# Patient Record
Sex: MALE | Race: WHITE | NOT HISPANIC OR LATINO
[De-identification: names, ages, dates, MRNs, and addresses within clinical notes are randomized per-mention and may not be internally consistent; named-entity substitution may affect disease eponyms.]

---

## 2018-11-07 PROBLEM — Z00.00 ENCOUNTER FOR PREVENTIVE HEALTH EXAMINATION: Status: ACTIVE | Noted: 2018-11-07

## 2021-02-08 ENCOUNTER — APPOINTMENT (OUTPATIENT)
Dept: OTOLARYNGOLOGY | Facility: CLINIC | Age: 56
End: 2021-02-08

## 2021-03-31 ENCOUNTER — APPOINTMENT (OUTPATIENT)
Dept: DISASTER EMERGENCY | Facility: OTHER | Age: 56
End: 2021-03-31
Payer: COMMERCIAL

## 2021-03-31 PROCEDURE — 0011A: CPT

## 2021-04-28 ENCOUNTER — APPOINTMENT (OUTPATIENT)
Dept: DISASTER EMERGENCY | Facility: OTHER | Age: 56
End: 2021-04-28
Payer: COMMERCIAL

## 2021-04-28 PROCEDURE — 0012A: CPT

## 2021-12-02 ENCOUNTER — TRANSCRIPTION ENCOUNTER (OUTPATIENT)
Age: 56
End: 2021-12-02

## 2021-12-02 ENCOUNTER — INPATIENT (INPATIENT)
Facility: HOSPITAL | Age: 56
LOS: 0 days | Discharge: HOME | DRG: 287 | End: 2021-12-02
Attending: STUDENT IN AN ORGANIZED HEALTH CARE EDUCATION/TRAINING PROGRAM | Admitting: STUDENT IN AN ORGANIZED HEALTH CARE EDUCATION/TRAINING PROGRAM
Payer: COMMERCIAL

## 2021-12-02 ENCOUNTER — EMERGENCY (EMERGENCY)
Facility: HOSPITAL | Age: 56
LOS: 1 days | Discharge: ANOTHER HOSPITAL | End: 2021-12-02
Attending: EMERGENCY MEDICINE | Admitting: EMERGENCY MEDICINE
Payer: COMMERCIAL

## 2021-12-02 VITALS
TEMPERATURE: 99 F | OXYGEN SATURATION: 98 % | RESPIRATION RATE: 14 BRPM | HEART RATE: 67 BPM | SYSTOLIC BLOOD PRESSURE: 138 MMHG | DIASTOLIC BLOOD PRESSURE: 99 MMHG

## 2021-12-02 VITALS
WEIGHT: 190.04 LBS | RESPIRATION RATE: 16 BRPM | OXYGEN SATURATION: 100 % | DIASTOLIC BLOOD PRESSURE: 88 MMHG | SYSTOLIC BLOOD PRESSURE: 144 MMHG | TEMPERATURE: 97 F | HEART RATE: 82 BPM

## 2021-12-02 VITALS
TEMPERATURE: 98 F | HEART RATE: 69 BPM | WEIGHT: 190.04 LBS | RESPIRATION RATE: 19 BRPM | HEIGHT: 72 IN | DIASTOLIC BLOOD PRESSURE: 84 MMHG | SYSTOLIC BLOOD PRESSURE: 147 MMHG | OXYGEN SATURATION: 98 %

## 2021-12-02 VITALS
DIASTOLIC BLOOD PRESSURE: 70 MMHG | OXYGEN SATURATION: 95 % | HEART RATE: 60 BPM | RESPIRATION RATE: 17 BRPM | SYSTOLIC BLOOD PRESSURE: 115 MMHG

## 2021-12-02 DIAGNOSIS — S02.85XA FRACTURE OF ORBIT, UNSPECIFIED, INITIAL ENCOUNTER FOR CLOSED FRACTURE: Chronic | ICD-10-CM

## 2021-12-02 DIAGNOSIS — I21.4 NON-ST ELEVATION (NSTEMI) MYOCARDIAL INFARCTION: ICD-10-CM

## 2021-12-02 LAB
ANION GAP SERPL CALC-SCNC: 7 MMOL/L — SIGNIFICANT CHANGE UP (ref 5–17)
BUN SERPL-MCNC: 21 MG/DL — SIGNIFICANT CHANGE UP (ref 7–23)
CALCIUM SERPL-MCNC: 8.7 MG/DL — SIGNIFICANT CHANGE UP (ref 8.5–10.1)
CHLORIDE SERPL-SCNC: 108 MMOL/L — SIGNIFICANT CHANGE UP (ref 96–108)
CO2 SERPL-SCNC: 27 MMOL/L — SIGNIFICANT CHANGE UP (ref 22–31)
CREAT SERPL-MCNC: 1 MG/DL — SIGNIFICANT CHANGE UP (ref 0.5–1.3)
GLUCOSE SERPL-MCNC: 97 MG/DL — SIGNIFICANT CHANGE UP (ref 70–99)
HCT VFR BLD CALC: 42.3 % — SIGNIFICANT CHANGE UP (ref 39–50)
HGB BLD-MCNC: 14.9 G/DL — SIGNIFICANT CHANGE UP (ref 13–17)
MCHC RBC-ENTMCNC: 29.8 PG — SIGNIFICANT CHANGE UP (ref 27–34)
MCHC RBC-ENTMCNC: 35.2 GM/DL — SIGNIFICANT CHANGE UP (ref 32–36)
MCV RBC AUTO: 84.6 FL — SIGNIFICANT CHANGE UP (ref 80–100)
NRBC # BLD: 0 /100 WBCS — SIGNIFICANT CHANGE UP (ref 0–0)
PLATELET # BLD AUTO: 213 K/UL — SIGNIFICANT CHANGE UP (ref 150–400)
POTASSIUM SERPL-MCNC: 4.2 MMOL/L — SIGNIFICANT CHANGE UP (ref 3.5–5.3)
POTASSIUM SERPL-SCNC: 4.2 MMOL/L — SIGNIFICANT CHANGE UP (ref 3.5–5.3)
RBC # BLD: 5 M/UL — SIGNIFICANT CHANGE UP (ref 4.2–5.8)
RBC # FLD: 11.6 % — SIGNIFICANT CHANGE UP (ref 10.3–14.5)
SARS-COV-2 RNA SPEC QL NAA+PROBE: SIGNIFICANT CHANGE UP
SODIUM SERPL-SCNC: 142 MMOL/L — SIGNIFICANT CHANGE UP (ref 135–145)
TROPONIN I, HIGH SENSITIVITY RESULT: 27 NG/L — SIGNIFICANT CHANGE UP
TROPONIN I, HIGH SENSITIVITY RESULT: 79.2 NG/L — HIGH
WBC # BLD: 6.45 K/UL — SIGNIFICANT CHANGE UP (ref 3.8–10.5)
WBC # FLD AUTO: 6.45 K/UL — SIGNIFICANT CHANGE UP (ref 3.8–10.5)

## 2021-12-02 PROCEDURE — C1769: CPT

## 2021-12-02 PROCEDURE — 93458 L HRT ARTERY/VENTRICLE ANGIO: CPT | Mod: 26

## 2021-12-02 PROCEDURE — 99152 MOD SED SAME PHYS/QHP 5/>YRS: CPT

## 2021-12-02 PROCEDURE — 36415 COLL VENOUS BLD VENIPUNCTURE: CPT

## 2021-12-02 PROCEDURE — 93010 ELECTROCARDIOGRAM REPORT: CPT | Mod: 77

## 2021-12-02 PROCEDURE — 93005 ELECTROCARDIOGRAM TRACING: CPT

## 2021-12-02 PROCEDURE — 99285 EMERGENCY DEPT VISIT HI MDM: CPT

## 2021-12-02 PROCEDURE — 93010 ELECTROCARDIOGRAM REPORT: CPT

## 2021-12-02 PROCEDURE — 99285 EMERGENCY DEPT VISIT HI MDM: CPT | Mod: 25

## 2021-12-02 PROCEDURE — 80048 BASIC METABOLIC PNL TOTAL CA: CPT

## 2021-12-02 PROCEDURE — 99153 MOD SED SAME PHYS/QHP EA: CPT

## 2021-12-02 PROCEDURE — 87635 SARS-COV-2 COVID-19 AMP PRB: CPT

## 2021-12-02 PROCEDURE — 85027 COMPLETE CBC AUTOMATED: CPT

## 2021-12-02 PROCEDURE — C1887: CPT

## 2021-12-02 PROCEDURE — 84484 ASSAY OF TROPONIN QUANT: CPT

## 2021-12-02 PROCEDURE — 93458 L HRT ARTERY/VENTRICLE ANGIO: CPT

## 2021-12-02 PROCEDURE — C1894: CPT

## 2021-12-02 RX ORDER — ACETAMINOPHEN 500 MG
650 TABLET ORAL EVERY 6 HOURS
Refills: 0 | Status: DISCONTINUED | OUTPATIENT
Start: 2021-12-02 | End: 2021-12-02

## 2021-12-02 RX ORDER — CLOPIDOGREL BISULFATE 75 MG/1
300 TABLET, FILM COATED ORAL ONCE
Refills: 0 | Status: COMPLETED | OUTPATIENT
Start: 2021-12-02 | End: 2021-12-02

## 2021-12-02 RX ORDER — NIFEDIPINE 30 MG
30 TABLET, EXTENDED RELEASE 24 HR ORAL DAILY
Refills: 0 | Status: DISCONTINUED | OUTPATIENT
Start: 2021-12-02 | End: 2021-12-02

## 2021-12-02 RX ORDER — NIFEDIPINE 30 MG
1 TABLET, EXTENDED RELEASE 24 HR ORAL
Qty: 60 | Refills: 0
Start: 2021-12-02 | End: 2022-01-30

## 2021-12-02 RX ORDER — IBUPROFEN 200 MG
1 TABLET ORAL
Qty: 0 | Refills: 0 | DISCHARGE

## 2021-12-02 RX ORDER — ASPIRIN/CALCIUM CARB/MAGNESIUM 324 MG
325 TABLET ORAL ONCE
Refills: 0 | Status: COMPLETED | OUTPATIENT
Start: 2021-12-02 | End: 2021-12-02

## 2021-12-02 RX ADMIN — Medication 650 MILLIGRAM(S): at 22:18

## 2021-12-02 RX ADMIN — Medication 30 MILLIGRAM(S): at 23:15

## 2021-12-02 RX ADMIN — CLOPIDOGREL BISULFATE 300 MILLIGRAM(S): 75 TABLET, FILM COATED ORAL at 12:56

## 2021-12-02 RX ADMIN — Medication 325 MILLIGRAM(S): at 10:24

## 2021-12-02 NOTE — ASU PATIENT PROFILE, ADULT - FALL HARM RISK - UNIVERSAL INTERVENTIONS
Bed in lowest position, wheels locked, appropriate side rails in place/Call bell, personal items and telephone in reach/Instruct patient to call for assistance before getting out of bed or chair/Non-slip footwear when patient is out of bed/[place] to call system/Physically safe environment - no spills, clutter or unnecessary equipment/Purposeful Proactive Rounding/Room/bathroom lighting operational, light cord in reach

## 2021-12-02 NOTE — H&P CARDIOLOGY - HISTORY OF PRESENT ILLNESS
56 yo M with no significant PMhx  c/o palpitations and CP , dizziness and mild SOB while working. Pain lasted 20 minutes, rated a  5/10 in severity , non radiating. He does admit to occasional CP recently.  56 yo M with no significant PMhx  c/o palpitations and CP , dizziness and mild SOB while working. Pain lasted 20 minutes, rated a  5/10 in severity , non radiating. He does admit to occasional ALANIZ  and palpitations with activity. He went to [facility] today trop were 27 , 79 , he was transferred here for [facility].   No fever or chills. No N/V/D or abd pain.

## 2021-12-02 NOTE — ED PROVIDER NOTE - PROGRESS NOTE DETAILS
Rapid Trop I increase to 79. Case re-discussed with Dr. Chang. Give Plavix and he will see patient. Seen by Dr. Chang and patient will be transferred to [facility] cath by Dr. Child.

## 2021-12-02 NOTE — DISCHARGE NOTE NURSING/CASE MANAGEMENT/SOCIAL WORK - PATIENT PORTAL LINK FT
You can access the FollowMyHealth Patient Portal offered by [facility] by registering at the following website: [email or web address]. By joining [employer]’s FollowMyHealth portal, you will also be able to view your health information using other applications (apps) compatible with our system.

## 2021-12-02 NOTE — CONSULT NOTE ADULT - SUBJECTIVE AND OBJECTIVE BOX
Patient is a 55y old  Male who presents with a chief complaint of palpitations, CP    HPI: 56 y/o with pmhx of gerd who presents to the ED with chest palpations and CP. 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 Also states he felt a momentary twinge in his L chest which spontaneously resolved. Does not remember exact time of symptom onset. Reports palpitations lasted for 30 before resolving. States he was in his normal state of health last night and in the AM. States he has had several episodes of palpitations in the past 6 months, usually when he lays down to sleep. Also reports ALANIZ "recently" which usually resolve with rest. Has never seen a cardiologist but reports he had an TTE and carotid doppler a few years ago for dizziness which was reportedly WNL. At bedside, patient reports 1 episode of L sided chest twinge but otherwise has no complaints.       PAST MEDICAL & SURGICAL HISTORY:  No pertinent past medical history    Orbital wall fracture    MEDICATIONS  (STANDING):    MEDICATIONS  (PRN):    FAMILY HISTORY:    Denies Family history of CAD or early MI      Constitutional: denies fever, chills  HEENT: denies blurry vision, difficulty hearing  Respiratory: admits ALANIZ, denies cough  Cardiovascular: denies CP, palpitations, orthopnea, PND, LE edema  Gastrointestinal: denies nausea, vomiting, abdominal pain  Genitourinary: denies urinary changes  Skin: Denies rashes, itching  Neurologic: admits dizziness, denies headache, weakness  Hematology/Oncology: denies bleeding, easy bruising      SOCIAL HISTORY:  Current smoker, vapes daily. Former cigarette smoker, 1 ppd for 30 years    Vital Signs Last 24 Hrs  T(C): 36.3 (02 Dec 2021 09:25), Max: 36.3 (02 Dec 2021 09:25)  T(F): 97.3 (02 Dec 2021 09:25), Max: 97.3 (02 Dec 2021 09:25)  HR: 82 (02 Dec 2021 09:25) (82 - 82)  BP: 144/88 (02 Dec 2021 09:25) (144/88 - 144/88)  BP(mean): --  RR: 16 (02 Dec 2021 09:25) (16 - 16)  SpO2: 100% (02 Dec 2021 09:25) (100% - 100%)    Physical Exam:  General: Well developed, well nourished, NAD  HEENT: NCAT, EOMI bl, moist mucous membranes   Neck: Supple, nontender  Neurology: A&Ox3, nonfocal, sensation intact   Respiratory: CTA B/L, No W/R/R  CV: RRR, +S1/S2, no murmurs, rubs or gallops  Abdominal: Soft, NT, ND  Extremities: No C/C/E  Heme: No obvious ecchymosis or petechiae   Skin: warm, dry      ECG: NSR 78 bpm    I&O's Detail      LABS:                        14.9   6.45  )-----------( 213      ( 02 Dec 2021 10:33 )             42.3     12-02    142  |  108  |  21  ----------------------------<  97  4.2   |  27  |  1.00    Ca    8.7      02 Dec 2021 10:33              I&O's Summary    BNP  RADIOLOGY & ADDITIONAL STUDIES:

## 2021-12-02 NOTE — DISCHARGE NOTE PROVIDER - NSDCFUADDINST_GEN_ALL_CORE_FT

## 2021-12-02 NOTE — DISCHARGE NOTE PROVIDER - NSDCMRMEDTOKEN_GEN_ALL_CORE_FT
NexIUM 20 mg oral delayed release capsule: 2 cap(s) orally once a day  NIFEdipine 30 mg oral tablet, extended release: 1 tab(s) orally once a day

## 2021-12-02 NOTE — ED ADULT NURSE NOTE - OBJECTIVE STATEMENT
Patient alert and oriented X 3. Complaining of chest pain, dizziness X 45 minutes. Denies chest pain, shortness of breath, nausea, vomiting, headache, dizziness and fever. Lungs clears bilaterally. Respirations even and not labored. Abdomen soft non tender. WIll continue to moniter. Patient alert and oriented X 3. Complaining of chest pain, dizziness X 45 minutes. Denies chest pain, shortness of breath, nausea, vomiting, headache, dizziness and fever. Lungs clears bilaterally. Respirations even and not labored. Abdomen soft non tender. Symptoms resolved at this time.

## 2021-12-02 NOTE — ED PROVIDER NOTE - OBJECTIVE STATEMENT
56 yo white male with no significant PMHx was well up until this morning when while at work he developed palpitations as well as ill defined left sided chest pressure, dizziness and mild SOB all while scraping floor. Episode lasted for approximately 20 minutes and is now completely asymptomatic. States that recently has noted brief episodes of exertional chest pressure. No fever, chills, cough, neck pain, back pains or abdominal pains.

## 2021-12-02 NOTE — CONSULT NOTE ADULT - ATTENDING COMMENTS
Pt with typical anginal symptoms. ekg no ischemic changes but with stuttering sx now w + trops. loaded plavix. cont asa. plan for cath today. Risk (including but not limited to periprocedureal MI and death), benefits, alternatives fully explained to the patient. The patient agrees to the procedure and I will arrange to it to be done.

## 2021-12-02 NOTE — DISCHARGE NOTE PROVIDER - NSDCCPCAREPLAN_GEN_ALL_CORE_FT
PRINCIPAL DISCHARGE DIAGNOSIS  Diagnosis: Coronary artery spasm  Assessment and Plan of Treatment: s/p LHC with coronary spasm to right coronary artery.   Started on nifedipine 30mg daily   Follow up with cardiologist in 1-2 weeks

## 2021-12-02 NOTE — DISCHARGE NOTE NURSING/CASE MANAGEMENT/SOCIAL WORK - NSDCPEFALRISK_GEN_ALL_CORE
For information on Fall & Injury Prevention, visit: [email or web address] OR  [email or web address] OR  https://www.cdc.gov/steadi/patient.html

## 2021-12-02 NOTE — ED ADULT NURSE NOTE - NSICDXPASTSURGICALHX_GEN_ALL_CORE_FT
PAST SURGICAL HISTORY:  No significant past surgical history PAST SURGICAL HISTORY:  Orbital wall fracture

## 2021-12-02 NOTE — CONSULT NOTE ADULT - ASSESSMENT
54 y/o with pmhx of gerd who presents to the ED with chest palpations and CP. Now found to have elevated troponin    Chest pain/palpitations/elevated troponin  -Patient with palpitations for 30mins in AM and L sided chest twinge, now resolved  -EKG NSR no ischemic changes  -Initial trop negative at 27, 2nd trop increased to 79.2, obtain 3rd  -S/p FD asa and plavix 300mg  -TTE and carotid doppler a few years ago reportedly WNL  -Never had stress test  -Monitor on tele    - BP well controlled, monitor routine hemodynamics.  - Monitor and replete lytes, keep K>4, Mg>2.  - Other cardiovascular workup will depend on clinical course.  - All other workup per primary team.  - Will continue to follow.             CHARTING IN PROGRESS    54 y/o with pmhx of gerd who presents to the ED with chest palpations and CP. Now found to have elevated troponin    Chest pain/palpitations/elevated troponin, concern for ACS  -Patient with palpitations for 30mins in AM and L sided chest twinge, now resolved  -EKG NSR no ischemic changes  -Initial trop negative at 27, 2nd trop increased to 79.2, obtain 3rd  -S/p FD asa and plavix 300mg  -TTE and carotid doppler a few years ago reportedly WNL  -Never had stress test  -Given symptoms of typical chest pain, rising troponin, and potential for poor follow up (has not seen Dr in years), would be prudent to transfer for cath  -Monitor on tele    - BP well controlled, monitor routine hemodynamics.  - Monitor and replete lytes, keep K>4, Mg>2.  - Other cardiovascular workup will depend on clinical course.  - All other workup per primary team.  - Will continue to follow.             CHARTING IN PROGRESS    54 y/o with pmhx of gerd who presents to the ED with chest palpations and CP. Now found to have elevated troponin    Chest pain/palpitations/elevated troponin, concern for ACS  -Patient with palpitations for 30mins in AM and L sided chest twinge associated with dizziness, now resolved  -EKG NSR no ischemic changes  -Initial trop negative at 27, 2nd trop increased to 79.2, obtain 3rd  -S/p FD asa and plavix 300mg  -TTE and carotid doppler a few years ago reportedly WNL  -Never had stress test  -Given symptoms of typical chest pain, rising troponin, and potential for poor follow up (has not seen Dr in years), would be prudent to transfer for cath  -Monitor on tele    - BP well controlled, monitor routine hemodynamics.  - Monitor and replete lytes, keep K>4, Mg>2.  - Other cardiovascular workup will depend on clinical course.  - All other workup per primary team.  - Will continue to follow.                 56 y/o with pmhx of gerd who presents to the ED with chest palpations and CP. Now found to have elevated troponin    Chest pain/palpitations/elevated troponin, concern for ACS  -Patient with palpitations for 30mins in AM and L sided chest twinge associated with dizziness, now resolved  -EKG NSR no ischemic changes  -Initial trop negative at 27, 2nd trop increased to 79.2, obtain 3rd  -S/p FD asa and plavix 300mg  -TTE and carotid doppler a few years ago reportedly WNL  -Never had stress test  -Given symptoms of typical chest pain, rising troponin, and potential for poor follow up (has not seen Dr in years), would be prudent to transfer for cath  -Monitor on tele    - BP well controlled, monitor routine hemodynamics.  - Monitor and replete lytes, keep K>4, Mg>2.  - Other cardiovascular workup will depend on clinical course.  - All other workup per primary team.  - Will continue to follow.

## 2021-12-02 NOTE — DISCHARGE NOTE PROVIDER - NSDCCPTREATMENT_GEN_ALL_CORE_FT
PRINCIPAL PROCEDURE  Procedure: Left heart catheterization  Findings and Treatment: s/p diagnostic Left heart catheterization with coronary spasm to right coronary artery. Started on nifedipine 30mg daily

## 2021-12-02 NOTE — DISCHARGE NOTE PROVIDER - HOSPITAL COURSE
HPI:  54 yo M with no significant PMhx  c/o palpitations and CP , dizziness and mild SOB while working. Pain lasted 20 minutes, rated a  5/10 in severity , non radiating. He does admit to occasional ALANIZ  and palpitations with activity. He went to [facility] today trop were 27 , 79 , he was transferred here for [facility].   No fever or chills. No N/V/D or abd pain.    (02 Dec 2021 18:08)      12/2/21 s/p Left Heart Catheterization via Right radial artery and noted to have coronary spasm. Start Nifedipine 30mg daily

## 2021-12-02 NOTE — H&P CARDIOLOGY - PRO TOBACCO QUIT READY
has stopped cigarettes but now vaping and is  cutting down , working on quiting  all together/ready to quit

## 2021-12-02 NOTE — ED ADULT NURSE NOTE - NSIMPLEMENTINTERV_GEN_ALL_ED
Implemented All Universal Safety Interventions:  [place] to call system. Call bell, personal items and telephone within reach. Instruct patient to call for assistance. Room bathroom lighting operational. Non-slip footwear when patient is off stretcher. Physically safe environment: no spills, clutter or unnecessary equipment. Stretcher in lowest position, wheels locked, appropriate side rails in place.

## 2021-12-02 NOTE — DISCHARGE NOTE PROVIDER - CARE PROVIDER_API CALL
Gigi Child)  Cardiology; Internal Medicine; Interventional Cardiology  [address], [address]  [place], [place] [id number]  Phone: [phone]  Fax: [phone]  Follow Up Time: 2 weeks

## 2021-12-02 NOTE — ED ADULT TRIAGE NOTE - CHIEF COMPLAINT QUOTE
55 yr old male c/o " I started having palpitations, dizziness, and sob today at 0900. I'm also having bad heart burn." Denies syncope, LOC.

## 2021-12-02 NOTE — ED PROVIDER NOTE - CARE PLAN
1 Principal Discharge DX:	ACS (acute coronary syndrome)   Principal Discharge DX:	ACS (acute coronary syndrome)  Secondary Diagnosis:	NSTEMI (non-ST elevation myocardial infarction)

## 2021-12-06 ENCOUNTER — APPOINTMENT (OUTPATIENT)
Dept: CARDIOLOGY | Facility: CLINIC | Age: 56
End: 2021-12-06
Payer: COMMERCIAL

## 2021-12-06 ENCOUNTER — NON-APPOINTMENT (OUTPATIENT)
Age: 56
End: 2021-12-06

## 2021-12-06 VITALS
HEART RATE: 61 BPM | BODY MASS INDEX: 25.47 KG/M2 | OXYGEN SATURATION: 98 % | WEIGHT: 188 LBS | SYSTOLIC BLOOD PRESSURE: 130 MMHG | HEIGHT: 72 IN | DIASTOLIC BLOOD PRESSURE: 87 MMHG

## 2021-12-06 DIAGNOSIS — Z78.9 OTHER SPECIFIED HEALTH STATUS: ICD-10-CM

## 2021-12-06 DIAGNOSIS — F17.200 NICOTINE DEPENDENCE, UNSPECIFIED, UNCOMPLICATED: ICD-10-CM

## 2021-12-06 DIAGNOSIS — R07.9 CHEST PAIN, UNSPECIFIED: ICD-10-CM

## 2021-12-06 PROBLEM — K21.9 GASTRO-ESOPHAGEAL REFLUX DISEASE WITHOUT ESOPHAGITIS: Chronic | Status: ACTIVE | Noted: 2021-12-02

## 2021-12-06 PROCEDURE — 93000 ELECTROCARDIOGRAM COMPLETE: CPT

## 2021-12-06 PROCEDURE — 99214 OFFICE O/P EST MOD 30 MIN: CPT | Mod: 25

## 2021-12-06 PROCEDURE — 99406 BEHAV CHNG SMOKING 3-10 MIN: CPT

## 2021-12-06 RX ORDER — ESOMEPRAZOLE MAGNESIUM 20 MG/1
20 CAPSULE, DELAYED RELEASE ORAL
Refills: 0 | Status: ACTIVE | COMMUNITY

## 2021-12-09 NOTE — CARDIOLOGY SUMMARY
[de-identified] : SR  [de-identified] : The coronary circulation is right dominant.  \par LAD:Left anterior descending artery: Angiography shows mild atherosclerosis.\par CX Circumflex: Angiography shows minor irregularities.  \par RCA Proximal right coronary artery: Vessel spasm was noted and was treated with nitroglycerin.\par Left Heart Cath Ejection fraction is 60%

## 2021-12-09 NOTE — HISTORY OF PRESENT ILLNESS
[FreeTextEntry1] : 56-year-old man with a history of smoking, Prinzmetal angina presents today for a follow-up visit.\par \par He was initially seen in [place] ED on 12/2/2021 for chest pain and an NSTEMI.  He was sent for coronary angiogram which showed a proximal RCA lesion which improved completely with administration of nitroglycerin.  It was thought that he had significant vasospastic disease and started on a calcium channel blocker.\par \par He is here for a follow-up visit.  He states that he has been feeling more palpitations recently especially after he takes his medication.  He sometimes feels his heart racing at nighttime when he lays down.  The symptoms appear to be self-limiting.\par He   denies any chest pain, PND, orthopnea, lower extremity edema, near syncope, syncope, strokelike symptoms.\par He does not formally exercise but his job is labor-intensive (working wood dave).  He is currently taking a week off of work.  He has been compliant with the medications post his discharge.  He has been still vaping\par \par

## 2021-12-09 NOTE — DISCUSSION/SUMMARY
[FreeTextEntry1] : 56 year man with a history as listed presents for a followup cardiac evaluation. 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  His EKG does not have any ischemic changes.  He will continue with nifedipine 30 mg daily.  Hopefully this will prevent any further vasospastic disease. He will get a 2d echo to rule out underlying structural heart disease.   I have encouraged him to stay well-hydrated in order to support his blood pressure.  He has been having palpitations that do not appear to be arrhythmic in nature.  He will get a 2-week Zio patch to rule out any underlying arrhythmias.  We spoke at length about quitting vaping.\par He was noted to have mild CAD on his coronary angiography.  We will have to assess his lipid profile.  He most likely will need statin therapy in the near future.\par Exercise and diet counseling was performed in order to reduce her future cardiovascular risk. \par He will followup with me in 2-3 months or sooner if necessary.

## 2021-12-15 ENCOUNTER — APPOINTMENT (OUTPATIENT)
Dept: CARDIOLOGY | Facility: CLINIC | Age: 56
End: 2021-12-15
Payer: COMMERCIAL

## 2021-12-15 VITALS
OXYGEN SATURATION: 99 % | DIASTOLIC BLOOD PRESSURE: 84 MMHG | HEIGHT: 72 IN | BODY MASS INDEX: 24.92 KG/M2 | RESPIRATION RATE: 17 BRPM | HEART RATE: 61 BPM | SYSTOLIC BLOOD PRESSURE: 135 MMHG | WEIGHT: 184 LBS

## 2021-12-15 PROCEDURE — 93000 ELECTROCARDIOGRAM COMPLETE: CPT

## 2021-12-15 PROCEDURE — 99214 OFFICE O/P EST MOD 30 MIN: CPT

## 2021-12-17 ENCOUNTER — APPOINTMENT (OUTPATIENT)
Dept: CARDIOLOGY | Facility: CLINIC | Age: 56
End: 2021-12-17
Payer: COMMERCIAL

## 2021-12-17 PROCEDURE — 93306 TTE W/DOPPLER COMPLETE: CPT

## 2021-12-22 ENCOUNTER — NON-APPOINTMENT (OUTPATIENT)
Age: 56
End: 2021-12-22

## 2021-12-22 NOTE — REASON FOR VISIT
[FreeTextEntry1] : Kal Reeves is a 56 year old man who was recently admitted for NSTEMI after transfer from [facility] to [facility]. He was found to have coronary spasm of proximal RCA which resolved with nitroglycerin IC. He was started on CCB and discharged.\par In the interim period, he has followed up with Dr. Ailyn Chang and has returned to labor intesive work. \par He is doing well, no further issues on nifedipine

## 2021-12-22 NOTE — CARDIOLOGY SUMMARY
[de-identified] : Sinus rhythm  [de-identified] : The coronary circulation is right dominant. \par LAD:Left anterior descending artery: Angiography shows mild atherosclerosis.\par CX Circumflex: Angiography shows minor irregularities. \par RCA Proximal right coronary artery: Vessel spasm was noted and was treated with nitroglycerin.\par Left Heart Cath Ejection fraction is 60% \par

## 2021-12-22 NOTE — DISCUSSION/SUMMARY
[FreeTextEntry1] : 56 year old man with recent NSTEMI in the setting of coronary spasm, resolved with IC nitroglycerin. \par EKG is within normal limits, and he has no further symptoms. Doing well post cath, radial with good pulse. \par -continue nifedipine 30 mg daily, maintain at current dose for now. \par -counseled on coronary spasm physiology and triggers, as well as diet and exercise counseling\par -patient to follow up with Dr. Chang for cardiology needs including risk factor modification and further counseling \par

## 2022-01-03 ENCOUNTER — EMERGENCY (EMERGENCY)
Facility: HOSPITAL | Age: 57
LOS: 1 days | Discharge: HOME | End: 2022-01-03
Attending: EMERGENCY MEDICINE
Payer: COMMERCIAL

## 2022-01-03 VITALS
HEART RATE: 75 BPM | SYSTOLIC BLOOD PRESSURE: 139 MMHG | DIASTOLIC BLOOD PRESSURE: 90 MMHG | TEMPERATURE: 98 F | OXYGEN SATURATION: 96 % | WEIGHT: 175.05 LBS | HEIGHT: 72 IN | RESPIRATION RATE: 20 BRPM

## 2022-01-03 DIAGNOSIS — S02.85XA FRACTURE OF ORBIT, UNSPECIFIED, INITIAL ENCOUNTER FOR CLOSED FRACTURE: Chronic | ICD-10-CM

## 2022-01-03 PROCEDURE — 93010 ELECTROCARDIOGRAM REPORT: CPT

## 2022-01-03 PROCEDURE — 71045 X-RAY EXAM CHEST 1 VIEW: CPT | Mod: 26

## 2022-01-03 PROCEDURE — 99285 EMERGENCY DEPT VISIT HI MDM: CPT

## 2022-01-03 RX ORDER — ASPIRIN/CALCIUM CARB/MAGNESIUM 324 MG
162 TABLET ORAL ONCE
Refills: 0 | Status: COMPLETED | OUTPATIENT
Start: 2022-01-03 | End: 2022-01-03

## 2022-01-03 NOTE — ED PROVIDER NOTE - PHYSICAL EXAMINATION
General: NAD, well appearing  HEENT: Normocephalic, atraumatic  Neck: No apparent stiffness or JVD  Pulm: Chest wall symmetric and nontender, lungs clear to ascultation   Cardiac: Regular rate and regular rhythm  Abdomen: Nontender and nondistended  Skin: Skin is warm, dry and intact without rashes or lesions.  Neuro: No motor or sensory deficits above reported baseline  MSK: No deformity or tenderness above reported baseline

## 2022-01-03 NOTE — ED PROVIDER NOTE - PATIENT PORTAL LINK FT
You can access the FollowMyHealth Patient Portal offered by [facility] by registering at the following website: [email or web address]. By joining [employer]’s FollowMyHealth portal, you will also be able to view your health information using other applications (apps) compatible with our system.

## 2022-01-03 NOTE — ED PROVIDER NOTE - ATTENDING CONTRIBUTION TO CARE
I personally saw the patient with the resident, and completed the key components of the history and physical exam. I then discussed the management plan with the resident.   gen in nad resp clear cardiac no murmur abd soft neuro: CN II - XII intact, EOMI, PERRL, no papilledema, 5/5 muscle strength x 4 extremities, no sensory deficits, 2+ dtr globally, negative babinski, no ataxic gait, normal CIARA and FNT, normal romberg 56M Pmh coronary spasm found on cath last month, now on Nifedipine 30mg daily, p/w 2 hours chest pressure ending at 18:30 similar to previous spasm.  Currently denies pain, bleeding, SOB, chest pain, abdominal pain or fevers.  EKg nonischemic.  Ap - similar symptoms to prior. feeling better now. will get labs, delta troponin.

## 2022-01-03 NOTE — ED PROVIDER NOTE - OBJECTIVE STATEMENT
56M, 1 month s/p Left Heart Catheterization via Right radial artery and noted to have coronary spasm, now on Nifedipine 30mg daily, p/w 2 hours chest pressure ending at 18:30 similar to previous spasm.  Currently denies pain, bleeding, SOB, chest pain, abdominal pain or fevers.  Denies other pertinent medical problems.  Denies any substance use.

## 2022-01-03 NOTE — ED PROVIDER NOTE - CLINICAL SUMMARY MEDICAL DECISION MAKING FREE TEXT BOX
56M, 1 month s/p Left Heart Catheterization via Right radial artery and noted to have coronary spasm, now on Nifedipine 30mg daily, p/w 2 hours chest pressure ending at 18:30 similar to previous spasm.  EKG labs and imaging performed to evaluate the patient.

## 2022-01-04 ENCOUNTER — APPOINTMENT (OUTPATIENT)
Dept: CARDIOLOGY | Facility: CLINIC | Age: 57
End: 2022-01-04

## 2022-01-04 VITALS
HEART RATE: 59 BPM | TEMPERATURE: 98 F | RESPIRATION RATE: 16 BRPM | SYSTOLIC BLOOD PRESSURE: 119 MMHG | DIASTOLIC BLOOD PRESSURE: 78 MMHG | OXYGEN SATURATION: 98 %

## 2022-01-04 LAB
ALBUMIN SERPL ELPH-MCNC: 4.3 G/DL — SIGNIFICANT CHANGE UP (ref 3.3–5.2)
ALP SERPL-CCNC: 81 U/L — SIGNIFICANT CHANGE UP (ref 40–120)
ALT FLD-CCNC: 68 U/L — HIGH
ANION GAP SERPL CALC-SCNC: 12 MMOL/L — SIGNIFICANT CHANGE UP (ref 5–17)
AST SERPL-CCNC: 38 U/L — SIGNIFICANT CHANGE UP
BASOPHILS # BLD AUTO: 0.03 K/UL — SIGNIFICANT CHANGE UP (ref 0–0.2)
BASOPHILS NFR BLD AUTO: 0.3 % — SIGNIFICANT CHANGE UP (ref 0–2)
BILIRUB SERPL-MCNC: 0.2 MG/DL — LOW (ref 0.4–2)
BUN SERPL-MCNC: 23.9 MG/DL — HIGH (ref 8–20)
CALCIUM SERPL-MCNC: 9.3 MG/DL — SIGNIFICANT CHANGE UP (ref 8.6–10.2)
CHLORIDE SERPL-SCNC: 100 MMOL/L — SIGNIFICANT CHANGE UP (ref 98–107)
CO2 SERPL-SCNC: 27 MMOL/L — SIGNIFICANT CHANGE UP (ref 22–29)
CREAT SERPL-MCNC: 1.15 MG/DL — SIGNIFICANT CHANGE UP (ref 0.5–1.3)
EOSINOPHIL # BLD AUTO: 0.25 K/UL — SIGNIFICANT CHANGE UP (ref 0–0.5)
EOSINOPHIL NFR BLD AUTO: 2.9 % — SIGNIFICANT CHANGE UP (ref 0–6)
GLUCOSE SERPL-MCNC: 102 MG/DL — HIGH (ref 70–99)
HCT VFR BLD CALC: 40.4 % — SIGNIFICANT CHANGE UP (ref 39–50)
HGB BLD-MCNC: 13.9 G/DL — SIGNIFICANT CHANGE UP (ref 13–17)
IMM GRANULOCYTES NFR BLD AUTO: 0.7 % — SIGNIFICANT CHANGE UP (ref 0–1.5)
LYMPHOCYTES # BLD AUTO: 2.88 K/UL — SIGNIFICANT CHANGE UP (ref 1–3.3)
LYMPHOCYTES # BLD AUTO: 33.2 % — SIGNIFICANT CHANGE UP (ref 13–44)
MCHC RBC-ENTMCNC: 29.6 PG — SIGNIFICANT CHANGE UP (ref 27–34)
MCHC RBC-ENTMCNC: 34.4 GM/DL — SIGNIFICANT CHANGE UP (ref 32–36)
MCV RBC AUTO: 86.1 FL — SIGNIFICANT CHANGE UP (ref 80–100)
MONOCYTES # BLD AUTO: 0.6 K/UL — SIGNIFICANT CHANGE UP (ref 0–0.9)
MONOCYTES NFR BLD AUTO: 6.9 % — SIGNIFICANT CHANGE UP (ref 2–14)
NEUTROPHILS # BLD AUTO: 4.85 K/UL — SIGNIFICANT CHANGE UP (ref 1.8–7.4)
NEUTROPHILS NFR BLD AUTO: 56 % — SIGNIFICANT CHANGE UP (ref 43–77)
NT-PROBNP SERPL-SCNC: 9 PG/ML — SIGNIFICANT CHANGE UP (ref 0–300)
PLATELET # BLD AUTO: 206 K/UL — SIGNIFICANT CHANGE UP (ref 150–400)
POTASSIUM SERPL-MCNC: 4.5 MMOL/L — SIGNIFICANT CHANGE UP (ref 3.5–5.3)
POTASSIUM SERPL-SCNC: 4.5 MMOL/L — SIGNIFICANT CHANGE UP (ref 3.5–5.3)
PROT SERPL-MCNC: 7 G/DL — SIGNIFICANT CHANGE UP (ref 6.6–8.7)
RBC # BLD: 4.69 M/UL — SIGNIFICANT CHANGE UP (ref 4.2–5.8)
RBC # FLD: 11.6 % — SIGNIFICANT CHANGE UP (ref 10.3–14.5)
SODIUM SERPL-SCNC: 139 MMOL/L — SIGNIFICANT CHANGE UP (ref 135–145)
TROPONIN T SERPL-MCNC: <0.01 NG/ML — SIGNIFICANT CHANGE UP (ref 0–0.06)
WBC # BLD: 8.67 K/UL — SIGNIFICANT CHANGE UP (ref 3.8–10.5)
WBC # FLD AUTO: 8.67 K/UL — SIGNIFICANT CHANGE UP (ref 3.8–10.5)

## 2022-01-04 PROCEDURE — 36415 COLL VENOUS BLD VENIPUNCTURE: CPT

## 2022-01-04 PROCEDURE — 99284 EMERGENCY DEPT VISIT MOD MDM: CPT | Mod: 25

## 2022-01-04 PROCEDURE — 71045 X-RAY EXAM CHEST 1 VIEW: CPT

## 2022-01-04 PROCEDURE — 83880 ASSAY OF NATRIURETIC PEPTIDE: CPT

## 2022-01-04 PROCEDURE — 93005 ELECTROCARDIOGRAM TRACING: CPT

## 2022-01-04 PROCEDURE — 80053 COMPREHEN METABOLIC PANEL: CPT

## 2022-01-04 PROCEDURE — 85025 COMPLETE CBC W/AUTO DIFF WBC: CPT

## 2022-01-04 PROCEDURE — 84484 ASSAY OF TROPONIN QUANT: CPT

## 2022-01-04 RX ADMIN — Medication 162 MILLIGRAM(S): at 00:00

## 2022-01-04 NOTE — ED ADULT NURSE NOTE - CHIEF COMPLAINT QUOTE
C/O chest tightness for the past hour that occurred while at rest.  +lightheadedness. PT was hospitalized a month ago for coronory spasms. Denies SOB.

## 2022-01-04 NOTE — ED ADULT NURSE NOTE - OBJECTIVE STATEMENT
Pt A&O x 4 comes from home c/o chest pain that subsided upon arrival to ED. Pt reports pain was sharp and in middle of chest; reports hx of coronary spasm and stent 1 month ago. Pt denies SOB & NV. No diaphoresis or JVD. No edema present. Pt placed on portable telebox. Meds administered as prescribed. IV access obtained; specimens sent to lab. Will continue to monitor.

## 2022-01-18 ENCOUNTER — APPOINTMENT (OUTPATIENT)
Dept: CARDIOLOGY | Facility: CLINIC | Age: 57
End: 2022-01-18
Payer: COMMERCIAL

## 2022-01-18 ENCOUNTER — NON-APPOINTMENT (OUTPATIENT)
Age: 57
End: 2022-01-18

## 2022-01-18 VITALS
WEIGHT: 200 LBS | SYSTOLIC BLOOD PRESSURE: 148 MMHG | OXYGEN SATURATION: 98 % | HEART RATE: 68 BPM | BODY MASS INDEX: 27.09 KG/M2 | DIASTOLIC BLOOD PRESSURE: 87 MMHG | HEIGHT: 72 IN

## 2022-01-18 PROCEDURE — 93000 ELECTROCARDIOGRAM COMPLETE: CPT

## 2022-01-18 PROCEDURE — 99214 OFFICE O/P EST MOD 30 MIN: CPT

## 2022-01-18 RX ORDER — NIFEDIPINE 30 MG/1
30 TABLET, EXTENDED RELEASE ORAL DAILY
Qty: 90 | Refills: 3 | Status: DISCONTINUED | COMMUNITY
End: 2022-01-18

## 2022-01-18 NOTE — DISCUSSION/SUMMARY
[FreeTextEntry1] : 56 year man with a history as listed presents for a followup cardiac evaluation. 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  His EKG does not have any ischemic changes.  He is not tolerating the nifedipine. He will try Diltiazem 120mg Qday. \par He was noted to have mild CAD on his coronary angiography.  We will have to assess his lipid profile. He will need a statin in the future. He will have his baseline lab work, including lipids, done prior to the next visit. Exercise and diet counseling was performed in order to reduce her future cardiovascular risk. \par He will followup with me in 3 months or sooner if necessary.

## 2022-01-18 NOTE — CARDIOLOGY SUMMARY
[de-identified] : SR  [de-identified] : 12/2021 SR; sx correlate with SR [de-identified] : 12/17/21 normal LV function, stage 1 diastolic dysfunction.  [de-identified] : The coronary circulation is right dominant.  \par LAD:Left anterior descending artery: Angiography shows mild atherosclerosis.\par CX Circumflex: Angiography shows minor irregularities.  \par RCA Proximal right coronary artery: Vessel spasm was noted and was treated with nitroglycerin.\par Left Heart Cath Ejection fraction is 60%

## 2022-01-18 NOTE — HISTORY OF PRESENT ILLNESS
[FreeTextEntry1] : 56-year-old man with a history of smoking, Prinzmetal angina presents today for a follow-up visit.\par \par He was initially seen in [facility] ED on 12/2/2021 for chest pain and an NSTEMI.  He was sent for coronary angiogram which showed a proximal RCA lesion which improved completely with administration of nitroglycerin.  It was thought that he had significant vasospastic disease and started on a calcium channel blocker.\par \par He is here for a follow-up visit.  \par Since his last visit, he has noted that he has been complaining of palpitations after eating certain foods. He notes that his heart has been racing when trying to do 5 pushups. \par Since his last visit he went to [facility] for chest pain which was thought not to be cardiac in nature. 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  \par He has not been feeling well on the Nifedipine He feeling \par

## 2022-02-03 ENCOUNTER — TRANSCRIPTION ENCOUNTER (OUTPATIENT)
Age: 57
End: 2022-02-03

## 2022-02-06 LAB
CHOLEST SERPL-MCNC: 234 MG/DL
HDLC SERPL-MCNC: 47 MG/DL
LDLC SERPL CALC-MCNC: 161 MG/DL
NONHDLC SERPL-MCNC: 187 MG/DL
TRIGL SERPL-MCNC: 131 MG/DL

## 2022-02-09 ENCOUNTER — APPOINTMENT (OUTPATIENT)
Dept: CARDIOLOGY | Facility: CLINIC | Age: 57
End: 2022-02-09

## 2022-04-19 ENCOUNTER — NON-APPOINTMENT (OUTPATIENT)
Age: 57
End: 2022-04-19

## 2022-04-19 ENCOUNTER — APPOINTMENT (OUTPATIENT)
Dept: CARDIOLOGY | Facility: CLINIC | Age: 57
End: 2022-04-19
Payer: COMMERCIAL

## 2022-04-19 VITALS — SYSTOLIC BLOOD PRESSURE: 132 MMHG | DIASTOLIC BLOOD PRESSURE: 88 MMHG

## 2022-04-19 VITALS
DIASTOLIC BLOOD PRESSURE: 92 MMHG | OXYGEN SATURATION: 96 % | BODY MASS INDEX: 27.09 KG/M2 | HEIGHT: 72 IN | SYSTOLIC BLOOD PRESSURE: 144 MMHG | WEIGHT: 200 LBS | HEART RATE: 71 BPM

## 2022-04-19 PROCEDURE — 99214 OFFICE O/P EST MOD 30 MIN: CPT

## 2022-04-19 PROCEDURE — 93000 ELECTROCARDIOGRAM COMPLETE: CPT

## 2022-04-19 RX ORDER — DILTIAZEM HYDROCHLORIDE 120 MG/1
120 CAPSULE, EXTENDED RELEASE ORAL
Qty: 90 | Refills: 3 | Status: DISCONTINUED | COMMUNITY
Start: 2022-01-18 | End: 2022-04-19
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
## 2022-04-21 NOTE — DISCUSSION/SUMMARY
[FreeTextEntry1] : 56 year man with a history as listed presents for a followup cardiac evaluation. \par Kal has significant vasospastic disease.  He is in no distress during this visit.  euvolemic on exam.  His EKG does not have any ischemic changes. He is tolerating diltiazem but would like to increase to 180mg given his recent episode.  \par \par I advised he follow up with gastroenterology to look further into his reflux symptoms.\par He was noted to have mild CAD on his coronary angiography.  For atherosclerotic disease and hyperlipidemia, he will continue Rosuvastatin 10mg daily. LDL is not at goal as yet, most recent being 161.\par \par Repeat labs in 3 months.\par \par Exercise and diet counseling was performed in order to reduce his future cardiovascular risk. \par \par

## 2022-04-21 NOTE — CARDIOLOGY SUMMARY
[de-identified] : SR  [de-identified] : 12/2021 SR; sx correlate with SR [de-identified] : 12/17/21 normal LV function, stage 1 diastolic dysfunction.  [de-identified] : 12/2021-The coronary circulation is right dominant.  \par LAD:Left anterior descending artery: Angiography shows mild atherosclerosis.\par CX Circumflex: Angiography shows minor irregularities.  \par RCA Proximal right coronary artery: Vessel spasm was noted and was treated with nitroglycerin.\par Left Heart Cath Ejection fraction is 60%

## 2022-04-21 NOTE — PHYSICAL EXAM
[Well Developed] : well developed [Well Nourished] : well nourished [No Acute Distress] : no acute distress [Normal Conjunctiva] : normal conjunctiva [Normal Venous Pressure] : normal venous pressure [No Carotid Bruit] : no carotid bruit [Normal S1, S2] : normal S1, S2 [Normal] : normal [Normal Rate] : normal [Rhythm Regular] : regular [Normal S1] : normal S1 [Normal S2] : normal S2 [No Murmur] : no murmurs heard [No Abnormalities] : the abdominal aorta was not enlarged and no bruit was heard [Clear Lung Fields] : clear lung fields [Good Air Entry] : good air entry [No Respiratory Distress] : no respiratory distress  [Soft] : abdomen soft [Non Tender] : non-tender [No Masses/organomegaly] : no masses/organomegaly [Normal Bowel Sounds] : normal bowel sounds [Normal Gait] : normal gait [No Edema] : no edema [No Cyanosis] : no cyanosis [No Clubbing] : no clubbing [No Varicosities] : no varicosities [No Rash] : no rash [No Skin Lesions] : no skin lesions [Moves all extremities] : moves all extremities [No Focal Deficits] : no focal deficits [Normal Speech] : normal speech [Alert and Oriented] : alert and oriented [Normal memory] : normal memory [Right Carotid Bruit] : no bruit heard over the right carotid [Left Carotid Bruit] : no bruit heard over the left carotid [Bruit] : no bruit heard

## 2022-07-26 ENCOUNTER — APPOINTMENT (OUTPATIENT)
Dept: SURGERY | Facility: CLINIC | Age: 57
End: 2022-07-26

## 2022-07-26 VITALS
HEART RATE: 68 BPM | HEIGHT: 72 IN | OXYGEN SATURATION: 96 % | RESPIRATION RATE: 16 BRPM | BODY MASS INDEX: 27.09 KG/M2 | WEIGHT: 200 LBS | DIASTOLIC BLOOD PRESSURE: 77 MMHG | TEMPERATURE: 97.9 F | SYSTOLIC BLOOD PRESSURE: 129 MMHG

## 2022-07-26 DIAGNOSIS — A63.0 ANOGENITAL (VENEREAL) WARTS: ICD-10-CM

## 2022-07-26 PROCEDURE — 99204 OFFICE O/P NEW MOD 45 MIN: CPT

## 2022-07-27 PROBLEM — A63.0 PENILE VENEREAL WARTS: Status: ACTIVE | Noted: 2022-07-27

## 2022-07-27 NOTE — HISTORY OF PRESENT ILLNESS
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

## 2022-07-27 NOTE — PHYSICAL EXAM
[Normal Breath Sounds] : Normal breath sounds [Normal Heart Sounds] : normal heart sounds [Normal Rate and Rhythm] : normal rate and rhythm [No Rash or Lesion] : No rash or lesion [Alert] : alert [Oriented to Person] : oriented to person [Oriented to Place] : oriented to place [Oriented to Time] : oriented to time [Calm] : calm [de-identified] : Healthy appearing gentleman in no distress [de-identified] : NIKKI ROJO EOMI [de-identified] : Soft, nontender nondistended, positive bowel sounds in all four quads.   No rebound or guarding. Right inguinal hernia, soft and reducible\par  [de-identified] : Hyperpigmented verrucous lesion in left groin.  Rough varigated skin on penus and scrotum, suspicious for condyloma  [de-identified] : Ambulating without difficulty or assistance

## 2022-07-27 NOTE — ASSESSMENT
[FreeTextEntry1] : Right inguinal hernia, soft and reducible.\par Would benefit from surgical repair.\par Recommend Robotic assisted Laparoscopic repair. \par Risk, Benefits, and Alternatives to surgery have been discussed.  This includes but is not limited to bleeding, infection, damage to adjacent structures, need for additional surgery or interventions, adverse effects of anesthesia such as cardio-respiratory complications, prolonged intubation, cardiac arrhythmia, arrest, and or death.  Risks of forgoing surgery have also been discussed including progression of, and/or worsening of current condition which may then require urgent or emergent treatment or surgery.\par Educational material courtesy of the [employer] College [employer] with respect to inguinal and femoral hernias has been provided for the patient's review and all questions have been answered.\par Routine PST and COVID screening.\par Will schedule \par \par WIth respect to lesion on genitals, I've recommended  evaluation.

## 2022-08-12 ENCOUNTER — APPOINTMENT (OUTPATIENT)
Dept: GASTROENTEROLOGY | Facility: CLINIC | Age: 57
End: 2022-08-12

## 2022-09-08 ENCOUNTER — OUTPATIENT (OUTPATIENT)
Dept: OUTPATIENT SERVICES | Facility: HOSPITAL | Age: 57
LOS: 1 days | End: 2022-09-08
Payer: COMMERCIAL

## 2022-09-08 VITALS
HEART RATE: 70 BPM | TEMPERATURE: 98 F | SYSTOLIC BLOOD PRESSURE: 130 MMHG | DIASTOLIC BLOOD PRESSURE: 84 MMHG | RESPIRATION RATE: 16 BRPM | OXYGEN SATURATION: 97 % | HEIGHT: 71 IN | WEIGHT: 199.96 LBS

## 2022-09-08 DIAGNOSIS — K40.90 UNILATERAL INGUINAL HERNIA, WITHOUT OBSTRUCTION OR GANGRENE, NOT SPECIFIED AS RECURRENT: ICD-10-CM

## 2022-09-08 DIAGNOSIS — S02.85XA FRACTURE OF ORBIT, UNSPECIFIED, INITIAL ENCOUNTER FOR CLOSED FRACTURE: Chronic | ICD-10-CM

## 2022-09-08 DIAGNOSIS — Z01.818 ENCOUNTER FOR OTHER PREPROCEDURAL EXAMINATION: ICD-10-CM

## 2022-09-08 DIAGNOSIS — Z98.890 OTHER SPECIFIED POSTPROCEDURAL STATES: Chronic | ICD-10-CM

## 2022-09-08 LAB
ALBUMIN SERPL ELPH-MCNC: 4.1 G/DL — SIGNIFICANT CHANGE UP (ref 3.3–5)
ALP SERPL-CCNC: 76 U/L — SIGNIFICANT CHANGE UP (ref 40–120)
ALT FLD-CCNC: 82 U/L — HIGH (ref 12–78)
ANION GAP SERPL CALC-SCNC: 6 MMOL/L — SIGNIFICANT CHANGE UP (ref 5–17)
AST SERPL-CCNC: 44 U/L — HIGH (ref 15–37)
BILIRUB SERPL-MCNC: 0.4 MG/DL — SIGNIFICANT CHANGE UP (ref 0.2–1.2)
BUN SERPL-MCNC: 19 MG/DL — SIGNIFICANT CHANGE UP (ref 7–23)
CALCIUM SERPL-MCNC: 8.9 MG/DL — SIGNIFICANT CHANGE UP (ref 8.5–10.1)
CHLORIDE SERPL-SCNC: 106 MMOL/L — SIGNIFICANT CHANGE UP (ref 96–108)
CO2 SERPL-SCNC: 29 MMOL/L — SIGNIFICANT CHANGE UP (ref 22–31)
CREAT SERPL-MCNC: 1.1 MG/DL — SIGNIFICANT CHANGE UP (ref 0.5–1.3)
EGFR: 80 ML/MIN/1.73M2 — SIGNIFICANT CHANGE UP
GLUCOSE SERPL-MCNC: 120 MG/DL — HIGH (ref 70–99)
HCT VFR BLD CALC: 40.5 % — SIGNIFICANT CHANGE UP (ref 39–50)
HGB BLD-MCNC: 14 G/DL — SIGNIFICANT CHANGE UP (ref 13–17)
MCHC RBC-ENTMCNC: 29.4 PG — SIGNIFICANT CHANGE UP (ref 27–34)
MCHC RBC-ENTMCNC: 34.6 GM/DL — SIGNIFICANT CHANGE UP (ref 32–36)
MCV RBC AUTO: 84.9 FL — SIGNIFICANT CHANGE UP (ref 80–100)
NRBC # BLD: 0 /100 WBCS — SIGNIFICANT CHANGE UP (ref 0–0)
PLATELET # BLD AUTO: 191 K/UL — SIGNIFICANT CHANGE UP (ref 150–400)
POTASSIUM SERPL-MCNC: 3.5 MMOL/L — SIGNIFICANT CHANGE UP (ref 3.5–5.3)
POTASSIUM SERPL-SCNC: 3.5 MMOL/L — SIGNIFICANT CHANGE UP (ref 3.5–5.3)
PROT SERPL-MCNC: 7.7 G/DL — SIGNIFICANT CHANGE UP (ref 6–8.3)
RBC # BLD: 4.77 M/UL — SIGNIFICANT CHANGE UP (ref 4.2–5.8)
RBC # FLD: 11.3 % — SIGNIFICANT CHANGE UP (ref 10.3–14.5)
SODIUM SERPL-SCNC: 141 MMOL/L — SIGNIFICANT CHANGE UP (ref 135–145)
WBC # BLD: 7.32 K/UL — SIGNIFICANT CHANGE UP (ref 3.8–10.5)
WBC # FLD AUTO: 7.32 K/UL — SIGNIFICANT CHANGE UP (ref 3.8–10.5)

## 2022-09-08 PROCEDURE — 93010 ELECTROCARDIOGRAM REPORT: CPT

## 2022-09-08 PROCEDURE — 86901 BLOOD TYPING SEROLOGIC RH(D): CPT

## 2022-09-08 PROCEDURE — 80053 COMPREHEN METABOLIC PANEL: CPT

## 2022-09-08 PROCEDURE — 85027 COMPLETE CBC AUTOMATED: CPT

## 2022-09-08 PROCEDURE — 93005 ELECTROCARDIOGRAM TRACING: CPT

## 2022-09-08 PROCEDURE — 86850 RBC ANTIBODY SCREEN: CPT

## 2022-09-08 PROCEDURE — G0463: CPT

## 2022-09-08 PROCEDURE — 36415 COLL VENOUS BLD VENIPUNCTURE: CPT

## 2022-09-08 PROCEDURE — 86900 BLOOD TYPING SEROLOGIC ABO: CPT

## 2022-09-08 NOTE — H&P PST ADULT - NSANTHOSAYNRD_GEN_A_CORE
No. MICKY screening performed.  STOP BANG Legend: 0-2 = LOW Risk; 3-4 = INTERMEDIATE Risk; 5-8 = HIGH Risk

## 2022-09-08 NOTE — H&P PST ADULT - PROBLEM SELECTOR PLAN 2
Labs - CBC, CMP, T&S and EKG.  COVID PCR 48-72 before DOS.   MC and CC requested  Pre op and Hibiclens instructions reviewed and given. Take routine am HÉCTOR XL andd Nexium, am of surgery with sip of water. Hold Advil and Aleve. Instructed to hold and/or avoid other NSAIDs and OTC supplements. Tylenol is ok. Verbalized understanding

## 2022-09-08 NOTE — H&P PST ADULT - NSICDXPASTMEDICALHX_GEN_ALL_CORE_FT
PAST MEDICAL HISTORY:  Blood vessel spasm RCA Dx Dec 2021    GERD (gastroesophageal reflux disease)     Unilateral inguinal hernia without obstruction or gangrene, recurrence not specified

## 2022-09-08 NOTE — H&P PST ADULT - HISTORY OF PRESENT ILLNESS
55 yo male with GERD and  H/O RCA vessel spasm, on HÉCTOR XR daily, presents to PST with a right inguinal bulge scheduled for a robotic assisted laparoscopic right inguinal hernia repair on 9/19 with Dr. Escalera. Denies abd pain, N/V, bowel changes, scrotal pain,  swelling and urinary symptomology

## 2022-09-08 NOTE — H&P PST ADULT - NSICDXPASTSURGICALHX_GEN_ALL_CORE_FT
PAST SURGICAL HISTORY:  H/O coronary angiogram Dec 2021    Orbital wall fracture left side reconstruction 1991

## 2022-09-08 NOTE — H&P PST ADULT - ASSESSMENT
scheduled for a robotic assisted laparoscopic right inguinal hernia repair on 9/19 with Dr. Escalera.

## 2022-09-08 NOTE — H&P PST ADULT - NSANTHOBSERVEDRD_ENT_A_CORE
Subjective:      Major portion of history was provided by parent    Patient ID: Floyd Crain Jr. is a 4 y.o. male.    Chief Complaint: Other (L side undecended testicle)      HPI:   Floyd is  referred by Dr Norwood for evaluation and management of  a left  undescended testicle .  It was noticed after birth and at multiple visits according to his mother.  At the last visit needed testicle could be located in the scrotum.. There  has not been any  improvement  There are not  any other complaints.  There is not  a family history of testicular cancer.       No current outpatient prescriptions on file.     No current facility-administered medications for this visit.        Allergies: Patient has no known allergies.    No past medical history on file.  Past Surgical History:   Procedure Laterality Date    CIRCUMCISION       No family history on file.  Social History   Substance Use Topics    Smoking status: Passive Smoke Exposure - Never Smoker     Types: Cigarettes    Smokeless tobacco: Never Used      Comment: [facility] smokes inside/ outside.    Alcohol use Not on file       Review of Systems   Constitutional: Negative for activity change, appetite change, chills, fever and irritability.   HENT: Negative for congestion, drooling, ear discharge, facial swelling, hearing loss, nosebleeds and trouble swallowing.    Eyes: Negative for pain, discharge and redness.   Respiratory: Negative for apnea, cough, choking, wheezing and stridor.    Cardiovascular: Negative for leg swelling and cyanosis.   Gastrointestinal: Negative for abdominal distention, nausea and vomiting.   Endocrine: Negative for polyuria.   Genitourinary: Negative for discharge, penile pain, penile swelling, scrotal swelling and testicular pain.   Musculoskeletal: Negative for back pain, gait problem, joint swelling and neck stiffness.   Skin: Negative for color change, rash and wound.   Allergic/Immunologic: Negative for environmental allergies and food  allergies.   Neurological: Negative for tremors, seizures, facial asymmetry and weakness.   Hematological: Does not bruise/bleed easily.   Psychiatric/Behavioral: Negative for agitation, behavioral problems and sleep disturbance. The patient is not hyperactive.          Objective:   Physical Exam   Nursing note and vitals reviewed.  Constitutional: He appears well-developed and well-nourished. No distress.   HENT:   Head: Normocephalic and atraumatic.   Eyes: EOM are normal.   Neck: Normal range of motion. No tracheal deviation present.   Cardiovascular: Normal rate, regular rhythm and normal heart sounds.    No murmur heard.  Pulmonary/Chest: Effort normal and breath sounds normal. He has no wheezes.   Abdominal: Soft. Bowel sounds are normal. He exhibits no distension and no mass. There is no tenderness. There is no rebound and no guarding. Hernia confirmed negative in the right inguinal area and confirmed negative in the left inguinal area.   Genitourinary: Testes normal and penis normal. Cremasteric reflex is present. Right testis shows no mass, no swelling and no tenderness. Right testis is descended. Left testis shows no mass, no swelling and no tenderness. Left testis is descended. No paraphimosis, hypospadias, penile erythema or penile tenderness. No discharge found.         Musculoskeletal: Normal range of motion.   Lymphadenopathy: No inguinal adenopathy noted on the right or left side.   Neurological: He is alert.   Skin: Skin is warm and dry. No rash noted. He is not diaphoretic.         Assessment:       1. Retractile testis          Plan:   Floyd was seen today for other.    Diagnoses and all orders for this visit:    Retractile testis      He has bilateral retractile testes.  I discussed this with his mom and dad reassuring them that these are normal testes undergoing a normal reflex.  These do not have the same risks of infertility nor do they have the risk of cancer associated with undescended  testes.  However, they have been reported cases of retractile testes ascending back into the undescended position.  We should follow him yearly through puberty.  Return in 1 year            This note is dictated on a word recognition program.  There are word recognition mistakes that are occasionally missed on review.   Yes

## 2022-09-13 ENCOUNTER — NON-APPOINTMENT (OUTPATIENT)
Age: 57
End: 2022-09-13

## 2022-09-13 ENCOUNTER — APPOINTMENT (OUTPATIENT)
Dept: CARDIOLOGY | Facility: CLINIC | Age: 57
End: 2022-09-13

## 2022-09-13 VITALS
SYSTOLIC BLOOD PRESSURE: 132 MMHG | WEIGHT: 204 LBS | BODY MASS INDEX: 27.63 KG/M2 | HEART RATE: 65 BPM | OXYGEN SATURATION: 97 % | HEIGHT: 72 IN | DIASTOLIC BLOOD PRESSURE: 84 MMHG

## 2022-09-13 PROBLEM — K40.90 UNILATERAL INGUINAL HERNIA, WITHOUT OBSTRUCTION OR GANGRENE, NOT SPECIFIED AS RECURRENT: Chronic | Status: ACTIVE | Noted: 2022-09-08

## 2022-09-13 PROBLEM — I73.9 PERIPHERAL VASCULAR DISEASE, UNSPECIFIED: Chronic | Status: ACTIVE | Noted: 2022-09-08

## 2022-09-13 PROCEDURE — 99214 OFFICE O/P EST MOD 30 MIN: CPT | Mod: 25

## 2022-09-13 PROCEDURE — 93000 ELECTROCARDIOGRAM COMPLETE: CPT

## 2022-09-15 NOTE — HISTORY OF PRESENT ILLNESS
[FreeTextEntry1] : 56-year-old man with a history of smoking, Prinzmetal angina presents today for a follow-up visit.\par \par He was initially seen in [facility] ED on 12/2/2021 for chest pain and an NSTEMI.  He was sent for coronary angiogram which showed a proximal RCA lesion which improved completely with administration of nitroglycerin(RCA vessel spasm).  It was thought that he had significant vasospastic disease and started on a calcium channel blocker.\par \par \par \par Mr Mars Presents today for cardiac clearance in preparation for Robotic, laparoscopic inguinal hernia repair on September 19th at [facility].\par \mine Presented to an Urgent care about one month ago due to lower extremity swelling. \par He denies chest pains or pressure. He  denies dizzy spells, feeling faint or fainting, He   denies palpitations or difficulty breathing while laying flat.\par States he noticed the swelling a few weeks after increasing the diltiazem.\par \par \par He has been compliant with his medication regimen.

## 2022-09-15 NOTE — CARDIOLOGY SUMMARY
[de-identified] : SR  [de-identified] : 12/2021 SR; sx correlate with SR [de-identified] : 12/17/21 normal LV function, stage 1 diastolic dysfunction.  [de-identified] : 12/2021-The coronary circulation is right dominant.  \par LAD:Left anterior descending artery: Angiography shows mild atherosclerosis.\par CX Circumflex: Angiography shows minor irregularities.  \par RCA Proximal right coronary artery: Vessel spasm was noted and was treated with nitroglycerin.\par Left Heart Cath Ejection fraction is 60%

## 2022-09-15 NOTE — PHYSICAL EXAM
[Well Developed] : well developed [Well Nourished] : well nourished [No Acute Distress] : no acute distress [Normal Conjunctiva] : normal conjunctiva [Normal Venous Pressure] : normal venous pressure [No Carotid Bruit] : no carotid bruit [Normal S1, S2] : normal S1, S2 [Normal] : normal [Normal Rate] : normal [Rhythm Regular] : regular [Normal S1] : normal S1 [Normal S2] : normal S2 [No Abnormalities] : the abdominal aorta was not enlarged and no bruit was heard [Clear Lung Fields] : clear lung fields [Good Air Entry] : good air entry [No Respiratory Distress] : no respiratory distress  [Soft] : abdomen soft [Non Tender] : non-tender [No Masses/organomegaly] : no masses/organomegaly [Normal Bowel Sounds] : normal bowel sounds [Normal Gait] : normal gait [No Edema] : no edema [No Cyanosis] : no cyanosis [No Clubbing] : no clubbing [No Varicosities] : no varicosities [No Rash] : no rash [No Skin Lesions] : no skin lesions [Moves all extremities] : moves all extremities [No Focal Deficits] : no focal deficits [Normal Speech] : normal speech [Alert and Oriented] : alert and oriented [Normal memory] : normal memory [No Murmur] : no murmur [1+] : left 1+ [Right Carotid Bruit] : no bruit heard over the right carotid [Left Carotid Bruit] : no bruit heard over the left carotid [Bruit] : no bruit heard

## 2022-09-15 NOTE — REASON FOR VISIT
[Hyperlipidemia] : hyperlipidemia [Other: ____] : [unfilled] [FreeTextEntry1] : He was initially seen in [place] ED on 12/2/2021 for chest pain and an NSTEMI.  He was sent for coronary angiogram which showed a proximal RCA lesion which improved completely with administration of nitroglycerin(RCA vessel spasm).  It was thought that he had significant vasospastic disease and started on a calcium channel blocker.\par \par He is here for a follow-up visit.  \par Since his last visit, he has noted that he has been complaining of palpitations after eating certain foods. \par Since his last visit he went to [facility] for chest pain which was thought not to be cardiac in nature. \par \par Had another episode of chest pains/and tightness a few nights ago. He did feel lightheaded/ dizzy at the time of the chest pain.\par  Episode occurred after eating a large meal - included eggs, cheese, peppers and onions.. He states he feels that it was exacerbated after having dairy and large meal.  Pain lasted for 30-45 minutes . Episode was relieved after taking Pepto-Bismol, resting and drinking water. \par \par He does not formally exercise but his job is labor-intensive (working wood dave).  \par \par He denies, PND, orthopnea, lower extremity edema, near syncope, syncope, strokelike symptoms.

## 2022-09-15 NOTE — DISCUSSION/SUMMARY
[FreeTextEntry1] : 56 year man with a history as listed presents for a followup cardiac evaluation. 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  He is in no distress during this visit.  euvolemic on exam.  His EKG does not have any ischemic changes. He is tolerating diltiazem but is likely the cause of his leg swelling.  Because he is preparing to undergo surgery, I have advised he maintain current dose at 180mg daily.\par Regarding Mild CAD on his coronary angiography.  For atherosclerotic disease and hyperlipidemia, he will continue Rosuvastatin 10mg daily. LDL is not at goal as yet, most recent being 161, will repeat again in 6 months.\par \par He has had follow up with gastroenterology to look further into his reflux symptoms, he is pending endoscopy/colonoscopy after inguinal hernia surgery. \par \par  The patient is optimized for the upcoming procedure with no cardiac contraindications. There is no evidence of active ischemic heart disease, decompensated heart failure, uncontrolled arrhythmia, or severe obstructive valvular disease. Routine hemodynamic monitoring will be sufficient.\par \par Exercise and diet counseling was performed in order to reduce his future cardiovascular risk. \par \par He will follow up again in February, sooner if any questions or concerns arise.  [EKG obtained to assist in diagnosis and management of assessed problem(s)] : EKG obtained to assist in diagnosis and management of assessed problem(s)

## 2022-09-15 NOTE — END OF VISIT
[FreeTextEntry3] : I saw and evaluated the patient and discussed the care with the NP provider above on 09/13/2022 . I agree with the findings and plan as documented in the note above. optimzed for endoscopic procedures.

## 2022-09-16 ENCOUNTER — OUTPATIENT (OUTPATIENT)
Dept: OUTPATIENT SERVICES | Facility: HOSPITAL | Age: 57
LOS: 1 days | End: 2022-09-16
Payer: COMMERCIAL

## 2022-09-16 DIAGNOSIS — Z98.890 OTHER SPECIFIED POSTPROCEDURAL STATES: Chronic | ICD-10-CM

## 2022-09-16 DIAGNOSIS — S02.85XA FRACTURE OF ORBIT, UNSPECIFIED, INITIAL ENCOUNTER FOR CLOSED FRACTURE: Chronic | ICD-10-CM

## 2022-09-16 DIAGNOSIS — Z20.828 CONTACT WITH AND (SUSPECTED) EXPOSURE TO OTHER VIRAL COMMUNICABLE DISEASES: ICD-10-CM

## 2022-09-16 LAB — SARS-COV-2 RNA SPEC QL NAA+PROBE: SIGNIFICANT CHANGE UP

## 2022-09-16 PROCEDURE — U0003: CPT

## 2022-09-16 PROCEDURE — U0005: CPT

## 2022-09-18 ENCOUNTER — TRANSCRIPTION ENCOUNTER (OUTPATIENT)
Age: 57
End: 2022-09-18

## 2022-09-19 ENCOUNTER — TRANSCRIPTION ENCOUNTER (OUTPATIENT)
Age: 57
End: 2022-09-19

## 2022-09-19 ENCOUNTER — OUTPATIENT (OUTPATIENT)
Dept: OUTPATIENT SERVICES | Facility: HOSPITAL | Age: 57
LOS: 1 days | End: 2022-09-19
Payer: COMMERCIAL

## 2022-09-19 ENCOUNTER — APPOINTMENT (OUTPATIENT)
Dept: SURGERY | Facility: HOSPITAL | Age: 57
End: 2022-09-19

## 2022-09-19 VITALS
OXYGEN SATURATION: 98 % | WEIGHT: 199.96 LBS | SYSTOLIC BLOOD PRESSURE: 156 MMHG | HEIGHT: 71 IN | DIASTOLIC BLOOD PRESSURE: 92 MMHG | HEART RATE: 69 BPM | TEMPERATURE: 98 F | RESPIRATION RATE: 16 BRPM

## 2022-09-19 VITALS
SYSTOLIC BLOOD PRESSURE: 124 MMHG | HEART RATE: 65 BPM | DIASTOLIC BLOOD PRESSURE: 76 MMHG | OXYGEN SATURATION: 98 % | TEMPERATURE: 98 F | RESPIRATION RATE: 15 BRPM

## 2022-09-19 DIAGNOSIS — K40.90 UNILATERAL INGUINAL HERNIA, WITHOUT OBSTRUCTION OR GANGRENE, NOT SPECIFIED AS RECURRENT: ICD-10-CM

## 2022-09-19 DIAGNOSIS — S02.85XA FRACTURE OF ORBIT, UNSPECIFIED, INITIAL ENCOUNTER FOR CLOSED FRACTURE: Chronic | ICD-10-CM

## 2022-09-19 DIAGNOSIS — Z98.890 OTHER SPECIFIED POSTPROCEDURAL STATES: Chronic | ICD-10-CM

## 2022-09-19 LAB — ABO RH CONFIRMATION: SIGNIFICANT CHANGE UP

## 2022-09-19 PROCEDURE — S2900 ROBOTIC SURGICAL SYSTEM: CPT

## 2022-09-19 PROCEDURE — C1781: CPT

## 2022-09-19 PROCEDURE — 36415 COLL VENOUS BLD VENIPUNCTURE: CPT

## 2022-09-19 PROCEDURE — S2900: CPT

## 2022-09-19 PROCEDURE — 49650 LAP ING HERNIA REPAIR INIT: CPT | Mod: AS,RT

## 2022-09-19 PROCEDURE — 49650 LAP ING HERNIA REPAIR INIT: CPT | Mod: RT

## 2022-09-19 DEVICE — MESH HERNIA PARIETEX ANATOMICAL 15 X 10CM RIGHT: Type: IMPLANTABLE DEVICE | Status: FUNCTIONAL

## 2022-09-19 RX ORDER — ROSUVASTATIN CALCIUM 5 MG/1
0 TABLET ORAL
Qty: 0 | Refills: 0 | DISCHARGE

## 2022-09-19 RX ORDER — DOCUSATE SODIUM 100 MG
1 CAPSULE ORAL
Qty: 60 | Refills: 0
Start: 2022-09-19

## 2022-09-19 RX ORDER — SODIUM CHLORIDE 9 MG/ML
1000 INJECTION, SOLUTION INTRAVENOUS
Refills: 0 | Status: DISCONTINUED | OUTPATIENT
Start: 2022-09-19 | End: 2022-09-19

## 2022-09-19 RX ORDER — OXYCODONE HYDROCHLORIDE 5 MG/1
5 TABLET ORAL ONCE
Refills: 0 | Status: DISCONTINUED | OUTPATIENT
Start: 2022-09-19 | End: 2022-09-19

## 2022-09-19 RX ORDER — DILTIAZEM HCL 120 MG
0 CAPSULE, EXT RELEASE 24 HR ORAL
Qty: 0 | Refills: 2 | DISCHARGE

## 2022-09-19 RX ORDER — CEFAZOLIN SODIUM 1 G
2000 VIAL (EA) INJECTION ONCE
Refills: 0 | Status: COMPLETED | OUTPATIENT
Start: 2022-09-19 | End: 2022-09-19

## 2022-09-19 RX ORDER — HYDROMORPHONE HYDROCHLORIDE 2 MG/ML
0.5 INJECTION INTRAMUSCULAR; INTRAVENOUS; SUBCUTANEOUS
Refills: 0 | Status: DISCONTINUED | OUTPATIENT
Start: 2022-09-19 | End: 2022-09-19

## 2022-09-19 RX ORDER — ONDANSETRON 8 MG/1
4 TABLET, FILM COATED ORAL ONCE
Refills: 0 | Status: DISCONTINUED | OUTPATIENT
Start: 2022-09-19 | End: 2022-09-19

## 2022-09-19 RX ORDER — IBUPROFEN 200 MG
1 TABLET ORAL
Qty: 0 | Refills: 0 | DISCHARGE

## 2022-09-19 RX ORDER — ESOMEPRAZOLE MAGNESIUM 40 MG/1
2 CAPSULE, DELAYED RELEASE ORAL
Qty: 0 | Refills: 0 | DISCHARGE

## 2022-09-19 RX ORDER — IBUPROFEN 200 MG
1 TABLET ORAL
Qty: 20 | Refills: 0
Start: 2022-09-19

## 2022-09-19 RX ORDER — OXYCODONE AND ACETAMINOPHEN 5; 325 MG/1; MG/1
1 TABLET ORAL
Qty: 30 | Refills: 0
Start: 2022-09-19

## 2022-09-19 RX ADMIN — OXYCODONE HYDROCHLORIDE 5 MILLIGRAM(S): 5 TABLET ORAL at 11:57

## 2022-09-19 RX ADMIN — SODIUM CHLORIDE 75 MILLILITER(S): 9 INJECTION, SOLUTION INTRAVENOUS at 08:23

## 2022-09-19 RX ADMIN — HYDROMORPHONE HYDROCHLORIDE 0.5 MILLIGRAM(S): 2 INJECTION INTRAMUSCULAR; INTRAVENOUS; SUBCUTANEOUS at 11:43

## 2022-09-19 RX ADMIN — SODIUM CHLORIDE 75 MILLILITER(S): 9 INJECTION, SOLUTION INTRAVENOUS at 11:28

## 2022-09-19 RX ADMIN — HYDROMORPHONE HYDROCHLORIDE 0.5 MILLIGRAM(S): 2 INJECTION INTRAMUSCULAR; INTRAVENOUS; SUBCUTANEOUS at 11:28

## 2022-09-19 RX ADMIN — OXYCODONE HYDROCHLORIDE 5 MILLIGRAM(S): 5 TABLET ORAL at 12:57

## 2022-09-19 NOTE — BRIEF OPERATIVE NOTE - NSICDXBRIEFPREOP_GEN_ALL_CORE_FT
PRE-OP DIAGNOSIS:  Unilateral inguinal hernia, without obstruction or gangrene, not specified as recurrent 19-Sep-2022 11:31:02  Kal Rain

## 2022-09-19 NOTE — ASU DISCHARGE PLAN (ADULT/PEDIATRIC) - NS MD DC FALL RISK RISK
For information on Fall & Injury Prevention, visit: [email or web address] OR  [email or web address] OR  https://www.cdc.gov/steadi/patient.html

## 2022-09-19 NOTE — BRIEF OPERATIVE NOTE - NSICDXBRIEFPROCEDURE_GEN_ALL_CORE_FT
PROCEDURES:  Herniorrhaphy, inguinal, robot-assisted, using da Yudelka Xi 19-Sep-2022 11:30:23 Right inguinal hernia repair Kal Rain

## 2022-09-19 NOTE — BRIEF OPERATIVE NOTE - NSICDXBRIEFPOSTOP_GEN_ALL_CORE_FT
POST-OP DIAGNOSIS:  Unilateral inguinal hernia, without obstruction or gangrene, not specified as recurrent 19-Sep-2022 11:31:07 Direct and indirect right inguinal hernia defects Kal Rain

## 2022-09-19 NOTE — ASU DISCHARGE PLAN (ADULT/PEDIATRIC) - ASU DC SPECIAL INSTRUCTIONSFT
You may remove the band-aids and shower in 48 hours.  Do not remove steri-strips.  Do not let water hit wounds directly, do not rub incisions.      No heavy lifting (nothing heavier than 5 lbs.), no strenuous physical activity, no exercise.      You may perform your usual daily tasks as tolerated.    You are encouraged to walk as much as possible to prevent blood clots in the legs, to maintain lung health after surgery/anesthesia, and to prevent constipation after surgery.      Continue to use the incentive spirometer at home.    You may resume your usual daily diet as tolerated.           Do not drive for 24 hours following anesthesia.  Do not drive while taking narcotic pain meds.  Do not drive until pain-free.      Keep your legs elevated as much as possible (with your feet 18-20 inches above your heart) to help prevent pain/throbbing/swelling at surgical site.     Take percocet as prescribed for pain.  Take colace as prescribed while taking narcotic pain medication to prevent constipation.  For moderate pain, take the ibuprofen (with food) as prescribed.  DO NOT TAKE THE PRESCRIPTION IBUPROFEN WITH ANY OTHER ANTI-INFLAMMATORIES (e.g. aleve, motrin, advil, etc.).  Do not mix over-the-counter anti-inflammatories.      Resume all your home medications as instructed in the discharge medication reconciliation.      Follow-up with Dr. Escalera in his office next week - call office for appointment if not already made.

## 2022-09-19 NOTE — ASU DISCHARGE PLAN (ADULT/PEDIATRIC) - CARE PROVIDER_API CALL
Al Escalera)  Surgery  [address] [place] [address]  Phone: [phone]  Fax: [phone]  Follow Up Time:

## 2022-09-30 ENCOUNTER — APPOINTMENT (OUTPATIENT)
Dept: SURGERY | Facility: CLINIC | Age: 57
End: 2022-09-30

## 2022-09-30 VITALS
TEMPERATURE: 97.7 F | SYSTOLIC BLOOD PRESSURE: 148 MMHG | OXYGEN SATURATION: 99 % | DIASTOLIC BLOOD PRESSURE: 94 MMHG | HEART RATE: 70 BPM

## 2022-09-30 DIAGNOSIS — Z87.19 PERSONAL HISTORY OF OTHER DISEASES OF THE DIGESTIVE SYSTEM: ICD-10-CM

## 2022-09-30 PROCEDURE — 99024 POSTOP FOLLOW-UP VISIT: CPT

## 2022-09-30 NOTE — PHYSICAL EXAM
[Normal Breath Sounds] : Normal breath sounds [Normal Heart Sounds] : normal heart sounds [Normal Rate and Rhythm] : normal rate and rhythm [No Rash or Lesion] : No rash or lesion [Alert] : alert [Oriented to Person] : oriented to person [Oriented to Place] : oriented to place [Oriented to Time] : oriented to time [Calm] : calm [de-identified] : Healthy appearing gentleman in no distress [de-identified] : NIKKI ROJO EOMI [de-identified] : Soft, nontender nondistended, positive bowel sounds in all four quads.   No rebound or guarding. Surgical incision well approximated.  No signs of recurrent hernia or masses. [de-identified] : Hyperpigmented verrucous lesion in left groin.  Rough varigated skin on penis and scrotum, suspicious for condyloma  [de-identified] : Ambulating without difficulty or assistance

## 2022-09-30 NOTE — ASSESSMENT
[FreeTextEntry1] : s/p Robotic repair of right inguinal hernia.  Healing well.  No signs of infection.  No recurrent hernia or masses.\par \par Postoperative instructions have been provided including avoidance of heavy lifting and strenuous activities in excess of 20-25 pounds for duration of 4-6 weeks. The patient may shower keeping the incisions clean, dry, covered as needed.\par \par f/u one month as needed.

## 2022-10-31 ENCOUNTER — RX RENEWAL (OUTPATIENT)
Age: 57
End: 2022-10-31

## 2022-11-01 ENCOUNTER — APPOINTMENT (OUTPATIENT)
Dept: SURGERY | Facility: CLINIC | Age: 57
End: 2022-11-01

## 2022-11-01 VITALS
BODY MASS INDEX: 27.63 KG/M2 | TEMPERATURE: 98 F | SYSTOLIC BLOOD PRESSURE: 140 MMHG | HEART RATE: 68 BPM | OXYGEN SATURATION: 98 % | WEIGHT: 204 LBS | HEIGHT: 72 IN | DIASTOLIC BLOOD PRESSURE: 80 MMHG

## 2022-11-01 DIAGNOSIS — G89.18 OTHER ACUTE POSTPROCEDURAL PAIN: ICD-10-CM

## 2022-11-01 DIAGNOSIS — R10.31 OTHER ACUTE POSTPROCEDURAL PAIN: ICD-10-CM

## 2022-11-01 PROCEDURE — 99024 POSTOP FOLLOW-UP VISIT: CPT

## 2022-11-03 PROBLEM — G89.18 POSTOPERATIVE RIGHT LOWER QUADRANT ABDOMINAL PAIN: Status: ACTIVE | Noted: 2022-11-03

## 2022-11-03 NOTE — PHYSICAL EXAM
[Normal Breath Sounds] : Normal breath sounds [Normal Heart Sounds] : normal heart sounds [Normal Rate and Rhythm] : normal rate and rhythm [No Rash or Lesion] : No rash or lesion [Alert] : alert [Oriented to Person] : oriented to person [Oriented to Place] : oriented to place [Oriented to Time] : oriented to time [Calm] : calm [de-identified] : Healthy appearing gentleman in no distress [de-identified] : NIKKI ROJO EOMI [de-identified] : Soft, nontender nondistended, positive bowel sounds in all four quads.   No rebound or guarding. Surgical incision well approximated.  No signs of recurrent hernia or masses. [de-identified] : Hyperpigmented verrucous lesion in left groin.  Rough varigated skin on penis and scrotum, suspicious for condyloma  [de-identified] : Ambulating without difficulty or assistance

## 2022-11-03 NOTE — ASSESSMENT
[FreeTextEntry1] : Physical exam remains unremarkable.  No evidence of recurrent hernia or masses.  Right lower quadrant symptoms are likely simply postsurgical patient feeling a transition between the mesh material and normal healthy tissue.  This will resolve over time as the mesh continues to become more incorporated.\par \par The patient is currently 6 weeks postoperative he may be resume normal daily activities including light exercise and stretching this may also help alleviate his symptoms.\par \par No indications for any further studies or surgical intervention.\par \par Follow-up as needed

## 2022-11-03 NOTE — HISTORY OF PRESENT ILLNESS
[de-identified] : Patient presents with some vague pulling sensation in the right lower quadrant.  Underwent robotic assisted repair of inguinal hernia approximately 6 weeks ago

## 2022-11-08 ENCOUNTER — APPOINTMENT (OUTPATIENT)
Dept: SURGERY | Facility: CLINIC | Age: 57
End: 2022-11-08

## 2022-12-23 ENCOUNTER — EMERGENCY (EMERGENCY)
Facility: HOSPITAL | Age: 57
LOS: 1 days | Discharge: HOME | End: 2022-12-23
Attending: EMERGENCY MEDICINE | Admitting: EMERGENCY MEDICINE
Payer: COMMERCIAL

## 2022-12-23 VITALS
DIASTOLIC BLOOD PRESSURE: 81 MMHG | OXYGEN SATURATION: 97 % | HEART RATE: 79 BPM | TEMPERATURE: 99 F | SYSTOLIC BLOOD PRESSURE: 139 MMHG | RESPIRATION RATE: 18 BRPM

## 2022-12-23 VITALS
WEIGHT: 210.1 LBS | RESPIRATION RATE: 24 BRPM | TEMPERATURE: 102 F | SYSTOLIC BLOOD PRESSURE: 120 MMHG | OXYGEN SATURATION: 95 % | HEART RATE: 75 BPM | DIASTOLIC BLOOD PRESSURE: 77 MMHG

## 2022-12-23 DIAGNOSIS — Z98.890 OTHER SPECIFIED POSTPROCEDURAL STATES: Chronic | ICD-10-CM

## 2022-12-23 DIAGNOSIS — S02.85XA FRACTURE OF ORBIT, UNSPECIFIED, INITIAL ENCOUNTER FOR CLOSED FRACTURE: Chronic | ICD-10-CM

## 2022-12-23 LAB
ALBUMIN SERPL ELPH-MCNC: 3.6 G/DL — SIGNIFICANT CHANGE UP (ref 3.3–5)
ALP SERPL-CCNC: 115 U/L — SIGNIFICANT CHANGE UP (ref 40–120)
ALT FLD-CCNC: 115 U/L — HIGH (ref 12–78)
ANION GAP SERPL CALC-SCNC: 7 MMOL/L — SIGNIFICANT CHANGE UP (ref 5–17)
APTT BLD: 31.5 SEC — SIGNIFICANT CHANGE UP (ref 27.5–35.5)
AST SERPL-CCNC: 69 U/L — HIGH (ref 15–37)
BASOPHILS # BLD AUTO: 0.01 K/UL — SIGNIFICANT CHANGE UP (ref 0–0.2)
BASOPHILS NFR BLD AUTO: 0.1 % — SIGNIFICANT CHANGE UP (ref 0–2)
BILIRUB SERPL-MCNC: 0.5 MG/DL — SIGNIFICANT CHANGE UP (ref 0.2–1.2)
BUN SERPL-MCNC: 13 MG/DL — SIGNIFICANT CHANGE UP (ref 7–23)
CALCIUM SERPL-MCNC: 8.4 MG/DL — LOW (ref 8.5–10.1)
CHLORIDE SERPL-SCNC: 105 MMOL/L — SIGNIFICANT CHANGE UP (ref 96–108)
CO2 SERPL-SCNC: 27 MMOL/L — SIGNIFICANT CHANGE UP (ref 22–31)
CREAT SERPL-MCNC: 1.1 MG/DL — SIGNIFICANT CHANGE UP (ref 0.5–1.3)
D DIMER BLD IA.RAPID-MCNC: <150 NG/ML DDU — SIGNIFICANT CHANGE UP
EGFR: 79 ML/MIN/1.73M2 — SIGNIFICANT CHANGE UP
EOSINOPHIL # BLD AUTO: 0.01 K/UL — SIGNIFICANT CHANGE UP (ref 0–0.5)
EOSINOPHIL NFR BLD AUTO: 0.1 % — SIGNIFICANT CHANGE UP (ref 0–6)
GLUCOSE SERPL-MCNC: 105 MG/DL — HIGH (ref 70–99)
HCT VFR BLD CALC: 39.9 % — SIGNIFICANT CHANGE UP (ref 39–50)
HGB BLD-MCNC: 13.5 G/DL — SIGNIFICANT CHANGE UP (ref 13–17)
IMM GRANULOCYTES NFR BLD AUTO: 0.3 % — SIGNIFICANT CHANGE UP (ref 0–0.9)
INR BLD: 1.21 RATIO — HIGH (ref 0.88–1.16)
LYMPHOCYTES # BLD AUTO: 1.58 K/UL — SIGNIFICANT CHANGE UP (ref 1–3.3)
LYMPHOCYTES # BLD AUTO: 20.8 % — SIGNIFICANT CHANGE UP (ref 13–44)
MCHC RBC-ENTMCNC: 29.2 PG — SIGNIFICANT CHANGE UP (ref 27–34)
MCHC RBC-ENTMCNC: 33.8 GM/DL — SIGNIFICANT CHANGE UP (ref 32–36)
MCV RBC AUTO: 86.2 FL — SIGNIFICANT CHANGE UP (ref 80–100)
MONOCYTES # BLD AUTO: 0.68 K/UL — SIGNIFICANT CHANGE UP (ref 0–0.9)
MONOCYTES NFR BLD AUTO: 8.9 % — SIGNIFICANT CHANGE UP (ref 2–14)
NEUTROPHILS # BLD AUTO: 5.31 K/UL — SIGNIFICANT CHANGE UP (ref 1.8–7.4)
NEUTROPHILS NFR BLD AUTO: 69.8 % — SIGNIFICANT CHANGE UP (ref 43–77)
NRBC # BLD: 0 /100 WBCS — SIGNIFICANT CHANGE UP (ref 0–0)
PLATELET # BLD AUTO: 160 K/UL — SIGNIFICANT CHANGE UP (ref 150–400)
POTASSIUM SERPL-MCNC: 3.5 MMOL/L — SIGNIFICANT CHANGE UP (ref 3.5–5.3)
POTASSIUM SERPL-SCNC: 3.5 MMOL/L — SIGNIFICANT CHANGE UP (ref 3.5–5.3)
PROT SERPL-MCNC: 7.7 G/DL — SIGNIFICANT CHANGE UP (ref 6–8.3)
PROTHROM AB SERPL-ACNC: 14.2 SEC — HIGH (ref 10.5–13.4)
RBC # BLD: 4.63 M/UL — SIGNIFICANT CHANGE UP (ref 4.2–5.8)
RBC # FLD: 11.8 % — SIGNIFICANT CHANGE UP (ref 10.3–14.5)
SODIUM SERPL-SCNC: 139 MMOL/L — SIGNIFICANT CHANGE UP (ref 135–145)
TROPONIN I, HIGH SENSITIVITY RESULT: 5.2 NG/L — SIGNIFICANT CHANGE UP
WBC # BLD: 7.61 K/UL — SIGNIFICANT CHANGE UP (ref 3.8–10.5)
WBC # FLD AUTO: 7.61 K/UL — SIGNIFICANT CHANGE UP (ref 3.8–10.5)

## 2022-12-23 PROCEDURE — 85610 PROTHROMBIN TIME: CPT

## 2022-12-23 PROCEDURE — 99285 EMERGENCY DEPT VISIT HI MDM: CPT

## 2022-12-23 PROCEDURE — 71045 X-RAY EXAM CHEST 1 VIEW: CPT

## 2022-12-23 PROCEDURE — 80053 COMPREHEN METABOLIC PANEL: CPT

## 2022-12-23 PROCEDURE — 71045 X-RAY EXAM CHEST 1 VIEW: CPT | Mod: 26

## 2022-12-23 PROCEDURE — 93005 ELECTROCARDIOGRAM TRACING: CPT

## 2022-12-23 PROCEDURE — 85379 FIBRIN DEGRADATION QUANT: CPT

## 2022-12-23 PROCEDURE — 99285 EMERGENCY DEPT VISIT HI MDM: CPT | Mod: 25

## 2022-12-23 PROCEDURE — 85730 THROMBOPLASTIN TIME PARTIAL: CPT

## 2022-12-23 PROCEDURE — 93010 ELECTROCARDIOGRAM REPORT: CPT

## 2022-12-23 PROCEDURE — 36415 COLL VENOUS BLD VENIPUNCTURE: CPT

## 2022-12-23 PROCEDURE — 85025 COMPLETE CBC W/AUTO DIFF WBC: CPT

## 2022-12-23 PROCEDURE — 84484 ASSAY OF TROPONIN QUANT: CPT

## 2022-12-23 RX ORDER — ACETAMINOPHEN 500 MG
650 TABLET ORAL ONCE
Refills: 0 | Status: COMPLETED | OUTPATIENT
Start: 2022-12-23 | End: 2022-12-23

## 2022-12-23 RX ADMIN — Medication 650 MILLIGRAM(S): at 18:08

## 2022-12-23 NOTE — ED PROVIDER NOTE - NS ED ATTENDING STATEMENT MOD
This was a shared visit with the SAAD. I reviewed and verified the documentation and independently performed the documented:

## 2022-12-23 NOTE — ED PROVIDER NOTE - OBJECTIVE STATEMENT
57-year-old male with past medical history of hypertension sent in by urgent care due to COVID and hypoxia.  Patient was found to have oxygen saturation of 93 to 94%.  Patient is a chronic smoker.  Patient reports that his wife was sick with COVID last week he developed mild symptoms on Monday with worsening symptoms on Wednesday.  Patient experienced chest heaviness more so associated with cough, sore throat, and fever.  T-max 102 Fahrenheit.  Patient took Tylenol reports hours ago.  Patient denies pleuritic chest pain, hemoptysis, leg swelling, abdominal pain, vomiting, diarrhea, history of blood clot, blood thinning medications, shortness of breath, or any other complaints.

## 2022-12-23 NOTE — ED ADULT NURSE NOTE - OBJECTIVE STATEMENT
received pt c/o cough/chest pressure/fevers.   Pt states wife is home sick with Covid and pt tested positive at [facility].  Respirations even and unlabored.  PT calm, appears in no acute distress.   Previously assessed by ed provided and medicated for fever at this time. BN

## 2022-12-23 NOTE — ED PROVIDER NOTE - NSFOLLOWUPCLINICS_GEN_ALL_ED_FT
[facility] - Primary Care  Primary Care  [address] [place] [address]  Phone: [phone]  Fax:   Follow Up Time: 1-3 Days

## 2022-12-23 NOTE — ED ADULT NURSE NOTE - CADM POA URETHRAL CATHETER
Dear Francia Hook,     We would like to welcome you to our practice and wish you a happy and healthy pregnancy and birth experience! We believe that this is a special time in your life and this is why we offer our patients and their families a variety of prenatal options. Our practice consists of 10 OB/GYN physicians, 4 certified nurse midwives, along with 5 nurse practitioners.       Your health and pregnancy history will be reviewed so that your care can be coordinated with the physicians and midwives. The physicians and midwives at [facility] will work together to provide you with the highest quality of care. All providers are on staff and will deliver your baby at the [facility].     During your pregnancy, you may also receive care from one of our nurse practitioners. They are educated in the care of pregnant as well as non-pregnant women, and are happy to answer any questions you may have about pregnancy and the birth experience.     Billing for your pregnancy is a global fee, which includes all routine prenatal visits, the delivery and postpartum visit. Lab tests, ultrasounds, non-stress test, non-routine pregnancy visits and your hospital stay are not included in the global fee. We recommend that you check with your insurance company for your maternity benefits.     If you have any questions or concerns, please do not hesitate to contact us at either of our locations, [place] [phone] or [place] [phone]. Once again, congratulations!  We hope to make your pregnancy and birth of your child a happy and satisfying experience.    Sincerely,    Dr. Moe Yuan, ALISSA Chandler, ALISSA Keith, CINDI  Aleta Frazier, ALISSA Cobos, ASHVIN Dupree,  NP  Vero Watters, NP  Shelly Siu, NP  Sandra Brice, NP      No

## 2022-12-23 NOTE — ED PROVIDER NOTE - CLINICAL SUMMARY MEDICAL DECISION MAKING FREE TEXT BOX
57-year-old male with history of CAD status post stents, hypertension, high cholesterol presents with URI over past 5 days, known positive for COVID.  Patient previously vaccinated x3, is not omicron boosted.  Patient complains of persistent cough, generalized malaise.  Some generalized myalgias and persistent fever.  Patient seen at urgent care today, was found to have an O2 sat of 93%, sent to the ER for evaluation.  Patient is not acutely short of breath at this time.  Patient with some occasional heaviness in the chest, no other acute chest pain.  No numbness or tingling or focal weakness.  No recent dyspnea on exertion/easy fatigue.  No recent travel or immobilization.  No aggravating or alleviating factors otherwise noted.  No other acute complaints at this time.  Exam: Nontoxic, well-appearing.  CTA BL, no W/R/R.  Abdomen soft, nontender, nondistended.  Normal cardiac exam.  Normal nonfocal detailed neuro exam.  Normal cardiac exam.  No C/C/E.  No other acute findings on exam.  Acute URI symptoms with known COVID with atypical chest pain.    Acute COVID infection, rule out electrode abnormality, elevated D-dimer or troponin to rule out acute cardiac injury, chest x-ray rule out acute pneumonia/infiltrates.  Check labs, x-ray, D-dimer, troponin, outpatient follow-up.

## 2022-12-23 NOTE — ED PROVIDER NOTE - NSFOLLOWUPINSTRUCTIONS_ED_ALL_ED_FT
Follow up with your primary care doctor. Consider follow up with cardiology and pulmonology. Return for chest pain, shortness of breath, worsening condition.       COVID-19      COVID-19, or coronavirus disease 2019, is a systemic infection that is caused by a novel coronavirus called SARS-CoV-2. In some people, the virus may not cause any symptoms. In others, it may cause mild or severe symptoms. Some people with severe infection develop severe disease, which may lead to acute respiratory distress syndrome and shock.      What are the causes?  The human body, showing how the coronavirus travels from the air to a person's lungs.   This illness is caused by a virus. The virus may be in the air or on surfaces as droplets or aerosols of various sizes. You may catch the virus by:  •Breathing in droplets from an infected person. Droplets can be spread by a person breathing, speaking, singing, coughing, or sneezing.      •Touching something, like a table or a doorknob, that was exposed to the virus (is contaminated) and then touching your mouth, nose, or eyes.        What increases the risk?    Risk for infection:     You are more likely to become infected with the COVID-19 virus if:  •You are within 6 ft (1.8 m) of a person with COVID-19 for 15 minutes or longer.      •You are providing care for a person who is infected with COVID-19.      •You are in close personal contact with other people. Close personal contact includes hugging, kissing, or sharing eating or drinking utensils.      Risk for serious illness due to COVID-19:    You are more likely to become seriously ill from the COVID-19 virus if:  •You have cancer.    •You have a long-term (chronic) disease, such as:  •Chronic lung disease, including pulmonary embolism, chronic obstructive pulmonary disease, and cystic fibrosis.      •Long-term disease that lowers your body's ability to fight infection (immunocompromise).      •Serious cardiac conditions, such as heart failure, coronary artery disease, or cardiomyopathy.      •Diabetes.      •Chronic kidney disease.      •Liver diseases, including cirrhosis, nonalcoholic fatty liver disease, alcoholic liver disease, or autoimmune hepatitis.        •You are obese.      •You are pregnant or recently pregnant.      •You have sickle cell disease.        What are the signs or symptoms?    Symptoms of this condition can range from mild to severe. Symptoms may appear any time from 2 to 14 days after being exposed to the virus. They include:  •Fever or chills.      •Difficulty breathing or having shortness of breath.      •Feeling tired or very tired.      •Headaches, body aches, or muscle aches.      •Runny or stuffy nose, sneezing, cough, sore throat.      •New loss of taste or smell. This is rare.      Some people may also have stomach problems, such as nausea, vomiting, or diarrhea.    Other people may not have any symptoms of COVID-19.      How is this diagnosed?  A sample being collected by swabbing the nose.   This condition may be diagnosed by testing samples to check for the COVID-19 virus. The most common tests are the PCR test and the antigen test. Tests may be done in the lab or at home. They include:  •Using a swab to take a sample of fluid from the back of your nose and throat (nasopharyngeal fluid), from your nose, or from your throat.      •Testing a sample of saliva from your mouth.      •Testing a sample of coughed-up mucus from your lungs (sputum).        How is this treated?    Treatment for COVID-19 infection depends on the severity of the condition.  •Mild symptoms can be managed at home with rest, fluids, and over-the-counter medicines.    •Serious symptoms may be treated in a hospital intensive care unit, or ICU. Treatment in the ICU may include:  •Supplemental oxygen. Extra oxygen is given through a tube in the nose, a face mask, or a yao.    •Medicines. You may be given medicines:  •To help your body fight off certain viruses that can cause disease (antivirals).      •To reduce inflammation and suppress the immune system (corticosteroids).      •To prevent or treat blood clots, if they develop (antithrombotics).        •Antibodies made in a lab that can restore or strengthen your body's natural immune system (monoclonal antibody).      •Positioning you to lie on your stomach (prone position). This makes it easier for oxygen to get into the lungs.      •Infection control measures.        If you are at risk for more serious illness due to COVID-19, your health care provider may prescribe a combination of two long-acting monoclonal antibodies, administered together every 6 months.      How is this prevented?    To protect yourself:   •Get vaccinated. COVID-19 vaccines are available for everyone aged 6 months and older.  •Vaccination is recommended for women who are pregnant, may become pregnant, or are breastfeeding.      •Patients who require major surgery should plan their vaccination for several days before or after the surgery.      •Talk to your health care provider about receiving experimental monoclonal antibodies. This treatment has been approved under emergency use authorization to prevent severe illness before or after you are exposed to the COVID-19 virus. You may be given monoclonal antibodies if:  •You are moderately or severely immunocompromised. This includes treatments that lower your immune response. People with immunocompromise may not develop protection against COVID-19 when they are vaccinated.      •You cannot be vaccinated. You may not receive a vaccine if you have a severe allergic reaction to the vaccine or its components.      •You are not fully vaccinated.      •You are in close contact with a person who is infected with SARS-CoV-2, or at high risk of exposure to SARS-CoV-2, in an institution in which COVID-19 is occurring.      •You are at risk of illness from new variants of the COVID-19 virus.        To protect others:     If you have symptoms of COVID-19, take steps to prevent the virus from spreading to others.  •Stay home. Leave your house only to seek medical care. Do not use public transport, if possible.      •Do not travel while you are sick.      •Wash your hands often with soap and water for at least 20 seconds. If soap and water are not available, use alcohol-based hand .      •Stay away from other members of your household. Let healthy household members care for children and pets, if possible. If you have to care for children or pets, wash your hands often and wear a mask. If possible, stay in your own room, separate from others. Use a different bathroom.      •Make sure that all people in your household wash their hands well and often.      •Cough or sneeze into a tissue or your sleeve or elbow. Do not cough or sneeze into your hand or into the air.        Where to find more information    •Centers for Disease Control and Prevention: www.cdc.gov/coronavirus      •World Health Organization: www.who.int/health-topics/coronavirus        Get help right away if:    •You have trouble breathing.      •You have pain or pressure in your chest.      •You have confusion.      •You have bluish lips and fingernails.      •You have difficulty waking from sleep.      •You have symptoms that get worse.      These symptoms may be an emergency. Get help right away. Call 911.   • Do not wait to see if the symptoms will go away.        •  Do not drive yourself to the hospital.         Summary    •COVID-19 is a respiratory infection that is caused by a virus.      •Some people with a severe COVID-19 infection develop severe disease, which may lead to acute respiratory distress syndrome and shock.      •The virus that causes COVID-19 is contagious. This means that it can spread from person to person through droplets from breathing, speaking, singing, coughing, or sneezing.      •Mild symptoms of COVID-19 can be managed at home with rest, fluids, and over-the-counter medicines.      This information is not intended to replace advice given to you by your health care provider. Make sure you discuss any questions you have with your health care provider.

## 2022-12-23 NOTE — ED PROVIDER NOTE - PATIENT PORTAL LINK FT
You can access the FollowMyHealth Patient Portal offered by [facility] by registering at the following website: [email or web address]. By joining [facility]’s FollowMyHealth portal, you will also be able to view your health information using other applications (apps) compatible with our system.

## 2022-12-23 NOTE — ED PROVIDER NOTE - CONDITION AT DISCHARGE:
Pt called asking for result of his send out labs. Free T4 and TSH results told to pt. Advised pt to see PCP follow up. PCP may elect to have repeat lab. Pt agreeable, no further questions. Improved

## 2022-12-23 NOTE — ED PROVIDER NOTE - PROGRESS NOTE DETAILS
Ambulating O2 WNL. All questions were answered. Discussed the importance of prompt, close medical follow-up. Patient will return with any changes, concerns or persistent/worsening symptoms.  Patient verbalized understanding.

## 2022-12-23 NOTE — ED PROVIDER NOTE - CARE PROVIDER_API CALL
Emerson Barger)  Internal Medicine; Pulmonary Disease  [address] [place] [address]  Phone: [phone]  Fax: [phone]  Follow Up Time: 1-3 Days

## 2023-02-07 ENCOUNTER — APPOINTMENT (OUTPATIENT)
Dept: CARDIOLOGY | Facility: CLINIC | Age: 58
End: 2023-02-07
Payer: COMMERCIAL

## 2023-02-07 ENCOUNTER — NON-APPOINTMENT (OUTPATIENT)
Age: 58
End: 2023-02-07

## 2023-02-07 VITALS
BODY MASS INDEX: 27.77 KG/M2 | OXYGEN SATURATION: 96 % | WEIGHT: 205 LBS | SYSTOLIC BLOOD PRESSURE: 130 MMHG | DIASTOLIC BLOOD PRESSURE: 83 MMHG | HEIGHT: 72 IN | HEART RATE: 69 BPM

## 2023-02-07 PROCEDURE — 99214 OFFICE O/P EST MOD 30 MIN: CPT | Mod: 25

## 2023-02-07 PROCEDURE — 93000 ELECTROCARDIOGRAM COMPLETE: CPT

## 2023-02-07 NOTE — CARDIOLOGY SUMMARY
[de-identified] : SR  [de-identified] : 12/2021 SR; sx correlate with SR [de-identified] : 12/17/21 normal LV function, stage 1 diastolic dysfunction.  [de-identified] : 12/2021-The coronary circulation is right dominant.  \par LAD:Left anterior descending artery: Angiography shows mild atherosclerosis.\par CX Circumflex: Angiography shows minor irregularities.  \par RCA Proximal right coronary artery: Vessel spasm was noted and was treated with nitroglycerin.\par Left Heart Cath Ejection fraction is 60%

## 2023-02-07 NOTE — HISTORY OF PRESENT ILLNESS
[FreeTextEntry1] : 56-year-old man with a history of smoking, Prinzmetal angina presents today for a follow-up visit.\par \par He was initially seen in [place] ED on 12/2/2021 for chest pain and an NSTEMI.  He was sent for coronary angiogram which showed a proximal RCA lesion which improved completely with administration of nitroglycerin(RCA vessel spasm).  It was thought that he had significant vasospastic disease and started on a calcium channel blocker.\par \par Since his last visit, he is s/p inguinal surgery. He has gained more weight. He has been complaining of dyspnea on exertion that is unchanged. He   denies any chest pain, PND, orthopnea, lower extremity edema, near syncope, syncope, strokelike symptoms. Medication reconciliation performed. He is compliant with his medications.

## 2023-02-07 NOTE — REASON FOR VISIT
[Hyperlipidemia] : hyperlipidemia [Other: ____] : [unfilled] [FreeTextEntry1] : He was initially seen in [place] ED on 12/2/2021 for chest pain and an NSTEMI.  He was sent for coronary angiogram which showed a proximal RCA lesion which improved completely with administration of nitroglycerin(RCA vessel spasm).  It was thought that he had significant vasospastic disease and started on a calcium channel blocker.\par \par He is here for a follow-up visit.  \par Since his last visit, he has noted that he has been complaining of palpitations after eating certain foods. \par Since his last visit he went to [facility] for chest pain which was thought not to be cardiac in nature. \par \par Had another episode of chest pains/and tightness a few nights ago. He did feel lightheaded/ dizzy at the time of the chest pain.\par  Episode occurred after eating a large meal - included eggs, cheese, peppers and onions.. He states he feels that it was exacerbated after having dairy and large meal.  Pain lasted for 30-45 minutes . Episode was relieved after taking Pepto-Bismol, resting and drinking water. \par \par He does not formally exercise but his job is labor-intensive (working wood dave).  \par \par He denies, PND, orthopnea, lower extremity edema, near syncope, syncope, strokelike symptoms.

## 2023-02-07 NOTE — DISCUSSION/SUMMARY
[FreeTextEntry1] : 57 year man with a history as listed presents for a followup cardiac evaluation. 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  He is in no distress during this visit.  euvolemic on exam.  His EKG does not have any ischemic changes. He is tolerating diltiazem but is likely the cause of his leg swelling.  He will continue Cardizem 180mg daily.\par \par Regarding Mild CAD on his coronary angiography.  For atherosclerotic disease and hyperlipidemia, he will continue Rosuvastatin 10mg daily. Repeat lipids prior to next visit. \par \par Exercise and diet counseling was performed in order to reduce his future cardiovascular risk. \par \par He will follow up again in 3-4 months sooner if any questions or concerns arise.  [EKG obtained to assist in diagnosis and management of assessed problem(s)] : EKG obtained to assist in diagnosis and management of assessed problem(s)

## 2023-02-21 ENCOUNTER — APPOINTMENT (OUTPATIENT)
Dept: CARDIOLOGY | Facility: CLINIC | Age: 58
End: 2023-02-21
Payer: COMMERCIAL

## 2023-02-21 PROCEDURE — 93306 TTE W/DOPPLER COMPLETE: CPT

## 2023-04-24 NOTE — ASU PATIENT PROFILE, ADULT - AS SC BRADEN ACTIVITY
(4) walks frequently Sarecycline Counseling: Patient advised regarding possible photosensitivity and discoloration of the teeth, skin, lips, tongue and gums.  Patient instructed to avoid sunlight, if possible.  When exposed to sunlight, patients should wear protective clothing, sunglasses, and sunscreen.  The patient was instructed to call the office immediately if the following severe adverse effects occur:  hearing changes, easy bruising/bleeding, severe headache, or vision changes.  The patient verbalized understanding of the proper use and possible adverse effects of sarecycline.  All of the patient's questions and concerns were addressed.

## 2023-05-01 ENCOUNTER — RX RENEWAL (OUTPATIENT)
Age: 58
End: 2023-05-01

## 2023-05-01 RX ORDER — DILTIAZEM HYDROCHLORIDE 180 MG/1
180 CAPSULE, EXTENDED RELEASE ORAL
Qty: 90 | Refills: 2 | Status: ACTIVE | COMMUNITY
Start: 2023-05-01

## 2023-05-30 NOTE — ASU PATIENT PROFILE, ADULT - PATIENT'S HEIGHT AND WEIGHT RECORDED IN THE VITAL SIGNS FLOWSHEET
Name: Trinh Gonzales  Age: 25 year old  YOB: 1997   Medical Record #: [id number]     Fetal Non-Stress Test Results    NST Ordered By: Tai Hoagn MD  NST Medical Indication: IUGR  Gestational Age: 32w2d       NST Start & Stop Times  NST Start Time: 1250  NST Stop Time: 1330    NST Results  Fetus A   Baseline Rate: 140  Variability: Present  Accelerations: Present  Decelerations: None  Interpretation: reactive and reassuring   Category:1            Tai Hoang MD  Obstetrics and Gynecology     yes

## 2023-09-06 ENCOUNTER — APPOINTMENT (OUTPATIENT)
Dept: CARDIOLOGY | Facility: CLINIC | Age: 58
End: 2023-09-06
Payer: COMMERCIAL

## 2023-09-06 VITALS
OXYGEN SATURATION: 95 % | HEIGHT: 72 IN | DIASTOLIC BLOOD PRESSURE: 77 MMHG | SYSTOLIC BLOOD PRESSURE: 117 MMHG | HEART RATE: 62 BPM | BODY MASS INDEX: 27.36 KG/M2 | WEIGHT: 202 LBS

## 2023-09-06 DIAGNOSIS — R00.2 PALPITATIONS: ICD-10-CM

## 2023-09-06 DIAGNOSIS — E78.5 HYPERLIPIDEMIA, UNSPECIFIED: ICD-10-CM

## 2023-09-06 PROCEDURE — 99214 OFFICE O/P EST MOD 30 MIN: CPT | Mod: 25

## 2023-09-06 PROCEDURE — 93000 ELECTROCARDIOGRAM COMPLETE: CPT

## 2023-09-06 NOTE — REASON FOR VISIT
[Hyperlipidemia] : hyperlipidemia [Other: ____] : [unfilled] [FreeTextEntry1] : He was initially seen in [place] ED on 12/2/2021 for chest pain and an NSTEMI.  He was sent for coronary angiogram which showed a proximal RCA lesion which improved completely with administration of nitroglycerin(RCA vessel spasm).  It was thought that he had significant vasospastic disease and started on a calcium channel blocker.\par  \par  He is here for a follow-up visit.  \par  Since his last visit, he has noted that he has been complaining of palpitations after eating certain foods. \par  Since his last visit he went to [facility] for chest pain which was thought not to be cardiac in nature. \par  \par  Had another episode of chest pains/and tightness a few nights ago. He did feel lightheaded/ dizzy at the time of the chest pain.\par   Episode occurred after eating a large meal - included eggs, cheese, peppers and onions.. He states he feels that it was exacerbated after having dairy and large meal.  Pain lasted for 30-45 minutes . Episode was relieved after taking Pepto-Bismol, resting and drinking water. \par  \par  He does not formally exercise but his job is labor-intensive (working wood dave).  \par  \par  He denies, PND, orthopnea, lower extremity edema, near syncope, syncope, strokelike symptoms.

## 2023-09-06 NOTE — CARDIOLOGY SUMMARY
[de-identified] : SR  [de-identified] : 12/2021 SR; sx correlate with SR [de-identified] : 12/17/21 normal LV function, stage 1 diastolic dysfunction.  [de-identified] : 12/2021-The coronary circulation is right dominant.  \par  LAD:Left anterior descending artery: Angiography shows mild atherosclerosis.\par  CX Circumflex: Angiography shows minor irregularities.  \par  RCA Proximal right coronary artery: Vessel spasm was noted and was treated with nitroglycerin.\par  Left Heart Cath Ejection fraction is 60%

## 2023-09-06 NOTE — HISTORY OF PRESENT ILLNESS
[FreeTextEntry1] : 57-year-old man with a history of smoking, Prinzmetal angina presents today for a follow-up visit.  He was initially seen in [place] ED on 12/2/2021 for chest pain and an NSTEMI.  He was sent for coronary angiogram which showed a proximal RCA lesion which improved completely with administration of nitroglycerin(RCA vessel spasm).  It was thought that he had significant vasospastic disease and started on a calcium channel blocker.  Since his last visit, he has random "head rush" that has improved with hydration.  He has not been maintaining a good diet.  He is trying to walk more. He   denies any chest pain, PND, orthopnea, lower extremity edema, near syncope, syncope, strokelike symptoms. Medication reconciliation performed. He is compliant with his medications.

## 2023-09-06 NOTE — PHYSICAL EXAM
[Well Developed] : well developed [Well Nourished] : well nourished [No Acute Distress] : no acute distress [Normal Conjunctiva] : normal conjunctiva [Normal Venous Pressure] : normal venous pressure [No Carotid Bruit] : no carotid bruit [Normal S1, S2] : normal S1, S2 [Normal] : normal [Normal Rate] : normal [Rhythm Regular] : regular [Normal S1] : normal S1 [Normal S2] : normal S2 [No Murmur] : no murmurs heard [1+] : left 1+ [Right Carotid Bruit] : no bruit heard over the right carotid [Left Carotid Bruit] : no bruit heard over the left carotid [No Abnormalities] : the abdominal aorta was not enlarged and no bruit was heard [Bruit] : no bruit heard [Clear Lung Fields] : clear lung fields [Good Air Entry] : good air entry [No Respiratory Distress] : no respiratory distress  [Soft] : abdomen soft [Non Tender] : non-tender [No Masses/organomegaly] : no masses/organomegaly [Normal Bowel Sounds] : normal bowel sounds [Normal Gait] : normal gait [No Edema] : no edema [No Cyanosis] : no cyanosis [No Clubbing] : no clubbing [No Varicosities] : no varicosities [No Rash] : no rash [No Skin Lesions] : no skin lesions [Moves all extremities] : moves all extremities [No Focal Deficits] : no focal deficits [Normal Speech] : normal speech [Alert and Oriented] : alert and oriented [Normal memory] : normal memory

## 2023-09-06 NOTE — DISCUSSION/SUMMARY
[FreeTextEntry1] : 57 year man with a history as listed presents for a followup cardiac evaluation.  Kal has significant vasospastic disease.  He is in no distress during this visit.  euvolemic on exam.  His EKG does not have any ischemic changes. He is tolerating diltiazem but is likely the cause of his leg swelling.  He will continue Cardizem 180mg daily.  Regarding Mild CAD on his coronary angiography.  For atherosclerotic disease and hyperlipidemia, he will continue Rosuvastatin 10mg daily. Repeat lipids prior to next visit.   Exercise and diet counseling was performed in order to reduce his future cardiovascular risk.   He will follow up again in 6 months sooner if any questions or concerns arise.  [EKG obtained to assist in diagnosis and management of assessed problem(s)] : EKG obtained to assist in diagnosis and management of assessed problem(s)

## 2023-10-31 ENCOUNTER — RX RENEWAL (OUTPATIENT)
Age: 58
End: 2023-10-31

## 2023-10-31 RX ORDER — ROSUVASTATIN CALCIUM 10 MG/1
10 TABLET, FILM COATED ORAL
Qty: 90 | Refills: 3 | Status: ACTIVE | COMMUNITY
Start: 2022-02-04 | End: 1900-01-01

## 2024-01-05 NOTE — ED ADULT TRIAGE NOTE - PRO INTERPRETER NEED 2
Chronic  Last albumin 09/15/23 was 2.0  Family reports improving Po intake and appetite  Labs ordered  Encourage increase protein intake   English

## 2024-02-05 ENCOUNTER — RX RENEWAL (OUTPATIENT)
Age: 59
End: 2024-02-05

## 2024-02-05 RX ORDER — DILTIAZEM HYDROCHLORIDE 180 MG/1
180 CAPSULE, EXTENDED RELEASE ORAL DAILY
Qty: 90 | Refills: 2 | Status: ACTIVE | COMMUNITY
Start: 2022-04-19

## 2024-02-26 NOTE — ED ADULT NURSE NOTE - NSFALLRSKOUTCOME_ED_ALL_ED
Inpatient Occupational Therapy Treatment    Unit: [facility]  Date:  2/26/2024  Patient Name:    Stone Hoskins  Admitting diagnosis:  Acute cholecystitis [K81.0]  Cholecystitis [K81.9]  Generalized weakness [R53.1]  Right upper quadrant abdominal pain [R10.11]  Altered mental status, unspecified altered mental status type [R41.82]  Admit Date:  2/21/2024  Precautions/Restrictions/WB Status/ Lines/ Wounds/ Oxygen: Fall risk, Bed/chair alarm, Lines (IV and external catheter), and Telesitter    External catheter discontinued during session per patient request, RN notified.    Treatment Time:  275-9103  Treatment Number:  4  Timed Code Treatment Minutes: 68 minutes  Total Treatment Minutes:  68 minutes    Patient Goals for Therapy: \"get back to washing my car\"      Discharge Recommendations: SNF, will need 24 hour physical assist and HHOT if going home  DME needs for discharge: Defer to facility       Therapy recommendations for staff:   Assist of 1 for ambulation with use of rolling walker (RW) and gait belt to/from BSC  to/from chair    History of Present Illness: Per H&P of Deborah Staley PA-C 2/21/2024: \"The patient is a 88 y.o. male with PMH of HLD, HTN, and BLE edema who presented to [facility] ED with complaint of cough, abd pain, and leg swelling. History limited 2/2 AMS. Pt's wife states that he is not the best historian, she states he doesn't usually need to know where he is or what month/year it is because he is always at home. She states that they have had respiratory symptoms for the past several days including congestion and cough. She states that this has not been improving but they have only been taking tessalon pearls for it. She states that last night he developed abdominal pain, dry heaving, and confusion. She states that she tried to give him some coke to drink and that settled his stomach for a little bit but did not last long. \"    2/21/2024 CT chest scan indicates concerns for pulmonary edema  secondary  to congestive heart failure.  US gallbladder results indicate possible cholecystitis.    Home Health S4 Level Recommendation:  NA    AM-PAC Score: AM-PAC Inpatient Daily Activity Raw Score: 14     Subjective:  Patient lying reclined in bed with family present (dtr).   Pt agreeable to this OT session.     Cognition:    A&O x4   Able to follow 1 step commands  Confused at times. Pleasant.    Pain:   No  Location: NA  Rating: NA /10  Pain Medicine Status: Denies need    Preadmission Environment:   Pt. Lives     Spouse in good health, home 24/7; sisters live in area as well to stop in and assist PRN  Home environment:    one story home  Steps to enter first floor:   one steps to enter; no handrails  Steps to second floor/basement: N/A  Laundry:     1st floor; one step to get down into utility room  Bathroom:     tub/shower unit and standard height toilet  Pt sleeps in a:    Flat bed  Equipment owned:    RW, rollator, and manual WC; Hurrycane cane    Preadmission Status:  Pt. Able to drive:    No  Pt. Fully independent with ADLs:  No  Pt. Required assistance for:   Dressing, Cleaning, Cooking, Laundry , and grocery shopping  Pt. independent for functional transfers and utilized Rollator/WC for mobility in home and Unknown out in community  History of falls:    Unknown  Home Health Services:  None; family reports HH in the past    Objective:  Does this pt have an acute or acute on chronic diagnosis of CHF? No    Balance:  Sitting:    SBA   Standing:    CGA-min A due to intermittent posterior lean with RW and gait belt; required cues to stand upright/closer to walker    Bed Mobility:   Supine to Sit:    CGA, HOB elevated to mid-range, With increased time, and With cues for hand placement   Sit to Supine:   Not Tested  Rolling:   Not Tested  Scooting in sitting: SBA and With increased time  Scooting in supine:  Not Tested    Transfers:    Sit to stand:    Min A  with increased time and verbal cues for hand placement  Stand  to sit:    Min A  with verbal cues for hand placement  Bed to chair:     Min A  and With RW and gait belt (patient with intermittent posterior lean)  Bed/ chair to standard toilet:  Mod A , With RW and gait belt, With increased time, and With cues for hand placement  Bed/chair to BSC:   Not Tested  Functional Mobility:   Min A-Mod A and With RW and gait belt, requiring MAX verbal and tactile cues throughout for walker placement and correcting posture, to and from bathroom with seated rest on toilet between     ADLs:  Dressing:      UE:   Mod A change gown  LE:    Max A  doff Depends, MOD A don pullup style incontinence product    Bathing:    UE:  Min A wash back, min cues for thoroughness at chest/arms with bath wipes  LE:  SBA wash LEs seated with bath wipes    Eating:   Not Tested    Toileting:  Min A for thoroughness with pericare; patient doffed Purewick due to discomfort with adhesive on hair-RN notified. MAX A doff Depends, MOD A don pullup style incontinence product    Grooming/hygiene: Max A wash hair with shampoo cap seated, MOD A comb hair (for thoroughness, only did R side of head prior to patient's dtr assisting with the back and L side of head)    Activity Tolerance:   Pt completed therapy session with fatigue     BP (mmHg) HR (bpm) SpO2 (%) on RA Comments   Semi-puckett in bed       Seated in chair post functional-mobility       Standing       End of session         Positioning Needs:   Pt reclined in chair, alarm set, call light provided and all needs within reach .     Ther Ex / Activities Initiated:   N/A    Patient/Family Education:   Pt educated on role of inpatient OT, plan of care, importance of continued activity, DC recommendations, Functional transfer/mobility safety, Safety awareness, Transfer techniques, and Calling for assist with mobility.    CHF Education  N/A    Assessment:  Pt seen for Occupational therapy treatment in acute care setting.  Pt demonstrated decreased Activity tolerance,  Universal Safety Interventions

## 2024-03-01 NOTE — ED ADULT NURSE NOTE - SCORE
Rx Refill Note  Requested Prescriptions     Pending Prescriptions Disp Refills    sacubitril-valsartan (Entresto)  MG tablet 60 tablet 3     Sig: Take 1 tablet by mouth 2 (Two) Times a Day.     Signed Prescriptions Disp Refills    hydrALAZINE (APRESOLINE) 50 MG tablet 270 tablet 3     Sig: Take 1 tablet by mouth 3 (Three) Times a Day.     Authorizing Provider: SAMY RIOS     Ordering User: KARLOS APARICIO      Last office visit with prescribing clinician: 2/27/2024   Last telemedicine visit with prescribing clinician: Visit date not found   Next office visit with prescribing clinician: 9/10/2024                         Would you like a call back once the refill request has been completed: [] Yes [] No    If the office needs to give you a call back, can they leave a voicemail: [] Yes [] No    Karlos Aparicio MA  03/01/24, 14:49 EST   2

## 2024-03-04 ENCOUNTER — APPOINTMENT (OUTPATIENT)
Dept: CARDIOLOGY | Facility: CLINIC | Age: 59
End: 2024-03-04
Payer: MEDICAID

## 2024-03-04 ENCOUNTER — NON-APPOINTMENT (OUTPATIENT)
Age: 59
End: 2024-03-04

## 2024-03-04 VITALS
SYSTOLIC BLOOD PRESSURE: 129 MMHG | HEART RATE: 64 BPM | DIASTOLIC BLOOD PRESSURE: 80 MMHG | WEIGHT: 204 LBS | BODY MASS INDEX: 27.63 KG/M2 | HEIGHT: 72 IN | OXYGEN SATURATION: 96 %

## 2024-03-04 DIAGNOSIS — R06.09 OTHER FORMS OF DYSPNEA: ICD-10-CM

## 2024-03-04 DIAGNOSIS — I73.9 PERIPHERAL VASCULAR DISEASE, UNSPECIFIED: ICD-10-CM

## 2024-03-04 PROCEDURE — 99214 OFFICE O/P EST MOD 30 MIN: CPT | Mod: 25

## 2024-03-04 PROCEDURE — 93000 ELECTROCARDIOGRAM COMPLETE: CPT

## 2024-03-04 PROCEDURE — G2211 COMPLEX E/M VISIT ADD ON: CPT | Mod: NC,1L

## 2024-03-04 RX ORDER — NITROGLYCERIN 0.4 MG/1
0.4 TABLET SUBLINGUAL
Qty: 30 | Refills: 0 | Status: ACTIVE | COMMUNITY
Start: 2024-03-04 | End: 1900-01-01

## 2024-03-04 NOTE — PHYSICAL EXAM
[Well Developed] : well developed [No Acute Distress] : no acute distress [Well Nourished] : well nourished [Normal Venous Pressure] : normal venous pressure [Normal Conjunctiva] : normal conjunctiva [No Carotid Bruit] : no carotid bruit [Normal S1, S2] : normal S1, S2 [Normal] : normal [Normal Rate] : normal [Rhythm Regular] : regular [Normal S1] : normal S1 [Normal S2] : normal S2 [No Murmur] : no murmurs heard [1+] : left 1+ [Left Carotid Bruit] : no bruit heard over the left carotid [Right Carotid Bruit] : no bruit heard over the right carotid [No Abnormalities] : the abdominal aorta was not enlarged and no bruit was heard [Bruit] : no bruit heard [Clear Lung Fields] : clear lung fields [Good Air Entry] : good air entry [Soft] : abdomen soft [No Respiratory Distress] : no respiratory distress  [Non Tender] : non-tender [No Masses/organomegaly] : no masses/organomegaly [Normal Gait] : normal gait [Normal Bowel Sounds] : normal bowel sounds [No Edema] : no edema [No Cyanosis] : no cyanosis [No Clubbing] : no clubbing [No Varicosities] : no varicosities [No Rash] : no rash [No Skin Lesions] : no skin lesions [Moves all extremities] : moves all extremities [No Focal Deficits] : no focal deficits [Normal Speech] : normal speech [Normal memory] : normal memory [Alert and Oriented] : alert and oriented

## 2024-03-04 NOTE — DISCUSSION/SUMMARY
Have labs drawn prior to Dr. Fabricio li. Go to lab around 12:30 pm.  Take your potassium when you get home.    Restrict fluid intake, use ice chips or hard candy.    low sodium diet:600 mg per meal  perform daily weights  call clinic if any new symptoms of shortness of breath or leg swelling  call clinic if weight gain of 3 lbs in 7 days or less  call clinic if any new sypmtoms of dizziness or light headedness  fluid restriction 64 ounces   [FreeTextEntry1] : 58 year man with a history as listed presents for a followup cardiac evaluation.   Kal has significant vasospastic disease.  He is in no distress during this visit.  euvolemic on exam.  His EKG does not have any ischemic changes. He is tolerating diltiazem but is likely the cause of his leg swelling.  He will continue Cardizem 180mg daily. He will have a nitro SL.   Regarding Mild CAD on his coronary angiography.  For atherosclerotic disease and hyperlipidemia, he will continue Rosuvastatin 10mg daily. He is complaining of more being winded. He will undergo a nuclear stress test to assess for underlying obstructive CAD.    Exercise and diet counseling was performed in order to reduce his future cardiovascular risk.   He will follow up again in 3-4months sooner if any questions or concerns arise.  [EKG obtained to assist in diagnosis and management of assessed problem(s)] : EKG obtained to assist in diagnosis and management of assessed problem(s)

## 2024-03-04 NOTE — HISTORY OF PRESENT ILLNESS
[FreeTextEntry1] : 57-year-old man with a history of smoking, Prinz metal angina presents today for a follow-up visit.  He was initially seen in [place] ED on 12/2/2021 for chest pain and an NSTEMI.  He was sent for coronary angiogram which showed a proximal RCA lesion which improved completely with administration of nitroglycerin(RCA vessel spasm).  It was thought that he had significant vasospastic disease and started on a calcium channel blocker.  Since his last visit,  he has been complaining of more dyspnea on when climbing the stairs. He intermittent will feel a random chest pain that is Non radiating Non exertional. He   denies any  PND, orthopnea, lower extremity edema, near syncope, syncope, stroke like symptoms. Medication reconciliation performed. He is compliant with his medications.

## 2024-03-04 NOTE — CARDIOLOGY SUMMARY
[de-identified] : SR  [de-identified] : 12/17/21 normal LV function, stage 1 diastolic dysfunction.  2/26/23 The left ventricular wall thickness is normal. The left ventricular systolic function is normal with an ejection fraction of 61 %. [de-identified] : 12/2021 SR; sx correlate with SR [de-identified] : 12/2021-The coronary circulation is right dominant.  \par  LAD:Left anterior descending artery: Angiography shows mild atherosclerosis.\par  CX Circumflex: Angiography shows minor irregularities.  \par  RCA Proximal right coronary artery: Vessel spasm was noted and was treated with nitroglycerin.\par  Left Heart Cath Ejection fraction is 60%

## 2024-03-04 NOTE — REVIEW OF SYSTEMS
[Negative] : Psychiatric [FreeTextEntry9] : leg swelling- see HPI [FreeTextEntry8] : inguinal hernia

## 2024-03-22 ENCOUNTER — NON-APPOINTMENT (OUTPATIENT)
Age: 59
End: 2024-03-22

## 2024-03-25 ENCOUNTER — APPOINTMENT (OUTPATIENT)
Dept: CARDIOLOGY | Facility: CLINIC | Age: 59
End: 2024-03-25
Payer: MEDICAID

## 2024-03-25 PROCEDURE — 78452 HT MUSCLE IMAGE SPECT MULT: CPT

## 2024-03-25 PROCEDURE — 93015 CV STRESS TEST SUPVJ I&R: CPT

## 2024-03-25 PROCEDURE — A9500: CPT

## 2024-07-16 NOTE — ED ADULT TRIAGE NOTE - HAVE YOU RECEIVED AT LEAST TWO PFIZER AND/OR MODERNA VACCINATIONS (IN ANY COMBINATION) AND/OR ONE JOHNSON & JOHNSON VACCINATION?
Detail Level: Detailed Yes Quality 137: Melanoma: Continuity Of Care - Recall System: Patient information entered into a recall system that includes: target date for the next exam specified AND a process to follow up with patients regarding missed or unscheduled appointments Detail Level: Generalized Patient Specific Counseling (Will Not Stick From Patient To Patient): [employer] recommended 30 min prior to sun exposure

## 2024-08-08 ENCOUNTER — APPOINTMENT (OUTPATIENT)
Dept: CARDIOLOGY | Facility: CLINIC | Age: 59
End: 2024-08-08

## 2024-08-08 ENCOUNTER — NON-APPOINTMENT (OUTPATIENT)
Age: 59
End: 2024-08-08

## 2024-08-08 PROCEDURE — 99215 OFFICE O/P EST HI 40 MIN: CPT | Mod: 25

## 2024-08-08 PROCEDURE — G2211 COMPLEX E/M VISIT ADD ON: CPT | Mod: NC,1L

## 2024-08-08 PROCEDURE — 93000 ELECTROCARDIOGRAM COMPLETE: CPT

## 2024-08-08 NOTE — HISTORY OF PRESENT ILLNESS
[FreeTextEntry1] : 57-year-old man with a history of smoking, Prinz metal angina presents today for a follow-up visit.  He was initially seen in [place] ED on 12/2/2021 for chest pain and an NSTEMI.  He was sent for coronary angiogram which showed a proximal RCA lesion which improved completely with administration of nitroglycerin(RCA vessel spasm).  It was thought that he had significant vasospastic disease and started on a calcium channel blocker.  Since his last visit, he had recurrent productive cough, took multiple founds of Abx. Saw pulmonary. He was told he had human meta pnuemovirus. He then did not feel well. He went to [facility] because while on his way to work he started to get N/V, sweating, chest pain. He was given Nitro and he felt better. He was admitted. He had a negative CT, echo, trops.  I personally reviewed all of the hospital records available to me at this time, which included but are not limited to the discharge summary, labs and imaging reports.   Since his hospitalization he has been feeling well. He   denies any chest pain, PND, orthopnea, lower extremity edema, near syncope, syncope, strokelike symptoms.  He is pending  sleep study.  Medication reconciliation performed. He is compliant with his medications.

## 2024-08-08 NOTE — DISCUSSION/SUMMARY
[FreeTextEntry1] : 58 year man with a history as listed presents for a followup cardiac evaluation.   Kal has significant vasospastic disease. he has been battling a viral infection which likely triggered his vasospastic angina His [facility] cardiac workup was negative.  His EKG does not have any ischemic changes. He is tolerating diltiazem.  He will continue Cardizem 180mg daily. He will have a nitro SL. Though long acting nitro caused a headache previously.   Regarding Mild CAD on his coronary angiography.  For atherosclerotic disease and hyperlipidemia, he will continue Rosuvastatin 10mg daily. He is complaining of more being winded. He will undergo a nuclear stress test to assess for underlying obstructive CAD.    Exercise and diet counseling was performed in order to reduce his future cardiovascular risk.   He will follow up again in 3-4months sooner if any questions or concerns arise.  [EKG obtained to assist in diagnosis and management of assessed problem(s)] : EKG obtained to assist in diagnosis and management of assessed problem(s)

## 2024-08-08 NOTE — CARDIOLOGY SUMMARY
[de-identified] : SR  [de-identified] : 12/2021 SR; sx correlate with SR [de-identified] : 12/17/21 normal LV function, stage 1 diastolic dysfunction.  2/26/23 The left ventricular wall thickness is normal. The left ventricular systolic function is normal with an ejection fraction of 61 %. [de-identified] : 12/2021-The coronary circulation is right dominant.   LAD:Left anterior descending artery: Angiography shows mild atherosclerosis. CX Circumflex: Angiography shows minor irregularities.  RCA Proximal right coronary artery: Vessel spasm was noted and was treated with nitroglycerin. Left Heart Cath Ejection fraction is 60%

## 2024-09-03 ENCOUNTER — APPOINTMENT (OUTPATIENT)
Dept: CARDIOLOGY | Facility: CLINIC | Age: 59
End: 2024-09-03

## 2024-11-16 LAB
ALBUMIN SERPL ELPH-MCNC: 4.6 G/DL
ALP BLD-CCNC: 81 U/L
ALT SERPL-CCNC: 37 U/L
ANION GAP SERPL CALC-SCNC: 15 MMOL/L
AST SERPL-CCNC: 25 U/L
BASOPHILS # BLD AUTO: 0.03 K/UL
BASOPHILS NFR BLD AUTO: 0.4 %
BILIRUB SERPL-MCNC: 0.4 MG/DL
BUN SERPL-MCNC: 17 MG/DL
CALCIUM SERPL-MCNC: 9.7 MG/DL
CHLORIDE SERPL-SCNC: 100 MMOL/L
CHOLEST SERPL-MCNC: 204 MG/DL
CO2 SERPL-SCNC: 26 MMOL/L
CREAT SERPL-MCNC: 1.01 MG/DL
EGFR: 87 ML/MIN/1.73M2
EOSINOPHIL # BLD AUTO: 0.24 K/UL
EOSINOPHIL NFR BLD AUTO: 3.4 %
ESTIMATED AVERAGE GLUCOSE: 117 MG/DL
GLUCOSE SERPL-MCNC: 120 MG/DL
HBA1C MFR BLD HPLC: 5.7 %
HCT VFR BLD CALC: 46.1 %
HDLC SERPL-MCNC: 44 MG/DL
HGB BLD-MCNC: 15.4 G/DL
IMM GRANULOCYTES NFR BLD AUTO: 0.4 %
LDLC SERPL CALC-MCNC: 121 MG/DL
LYMPHOCYTES # BLD AUTO: 2.22 K/UL
LYMPHOCYTES NFR BLD AUTO: 31.6 %
MAN DIFF?: NORMAL
MCHC RBC-ENTMCNC: 29.1 PG
MCHC RBC-ENTMCNC: 33.4 G/DL
MCV RBC AUTO: 87 FL
MONOCYTES # BLD AUTO: 0.51 K/UL
MONOCYTES NFR BLD AUTO: 7.3 %
NEUTROPHILS # BLD AUTO: 3.99 K/UL
NEUTROPHILS NFR BLD AUTO: 56.9 %
NONHDLC SERPL-MCNC: 160 MG/DL
PLATELET # BLD AUTO: 245 K/UL
POTASSIUM SERPL-SCNC: 4.1 MMOL/L
PROT SERPL-MCNC: 7.8 G/DL
RBC # BLD: 5.3 M/UL
RBC # FLD: 11.9 %
SODIUM SERPL-SCNC: 141 MMOL/L
TRIGL SERPL-MCNC: 222 MG/DL
TSH SERPL-ACNC: 1.74 UIU/ML
WBC # FLD AUTO: 7.02 K/UL

## 2024-12-03 ENCOUNTER — APPOINTMENT (OUTPATIENT)
Dept: CARDIOLOGY | Facility: CLINIC | Age: 59
End: 2024-12-03
Payer: MEDICAID

## 2024-12-03 ENCOUNTER — NON-APPOINTMENT (OUTPATIENT)
Age: 59
End: 2024-12-03

## 2024-12-03 VITALS
HEIGHT: 72 IN | BODY MASS INDEX: 26.68 KG/M2 | DIASTOLIC BLOOD PRESSURE: 85 MMHG | HEART RATE: 70 BPM | OXYGEN SATURATION: 95 % | WEIGHT: 197 LBS | SYSTOLIC BLOOD PRESSURE: 133 MMHG

## 2024-12-03 VITALS — SYSTOLIC BLOOD PRESSURE: 120 MMHG | DIASTOLIC BLOOD PRESSURE: 70 MMHG

## 2024-12-03 DIAGNOSIS — R07.9 CHEST PAIN, UNSPECIFIED: ICD-10-CM

## 2024-12-03 DIAGNOSIS — I73.9 PERIPHERAL VASCULAR DISEASE, UNSPECIFIED: ICD-10-CM

## 2024-12-03 DIAGNOSIS — E78.5 HYPERLIPIDEMIA, UNSPECIFIED: ICD-10-CM

## 2024-12-03 PROCEDURE — 99214 OFFICE O/P EST MOD 30 MIN: CPT

## 2024-12-03 PROCEDURE — G2211 COMPLEX E/M VISIT ADD ON: CPT | Mod: NC

## 2024-12-03 PROCEDURE — 93000 ELECTROCARDIOGRAM COMPLETE: CPT

## 2025-02-17 NOTE — ED CLERICAL - DIVISION
[facility]... Provider does not come back until 2/18/25, pt was cleared in December, please advise

## 2025-04-20 LAB
25(OH)D3 SERPL-MCNC: 22.2 NG/ML
ALBUMIN SERPL ELPH-MCNC: 4.5 G/DL
ALP BLD-CCNC: 80 U/L
ALT SERPL-CCNC: 44 U/L
ANION GAP SERPL CALC-SCNC: 11 MMOL/L
AST SERPL-CCNC: 30 U/L
BILIRUB SERPL-MCNC: 0.3 MG/DL
BUN SERPL-MCNC: 14 MG/DL
CALCIUM SERPL-MCNC: 9.4 MG/DL
CHLORIDE SERPL-SCNC: 102 MMOL/L
CHOLEST SERPL-MCNC: 154 MG/DL
CO2 SERPL-SCNC: 26 MMOL/L
CREAT SERPL-MCNC: 0.95 MG/DL
EGFRCR SERPLBLD CKD-EPI 2021: 93 ML/MIN/1.73M2
ESTIMATED AVERAGE GLUCOSE: 117 MG/DL
GLUCOSE SERPL-MCNC: 115 MG/DL
HBA1C MFR BLD HPLC: 5.7 %
HCT VFR BLD CALC: 44.5 %
HDLC SERPL-MCNC: 40 MG/DL
HGB BLD-MCNC: 14.7 G/DL
LDLC SERPL-MCNC: 81 MG/DL
MCHC RBC-ENTMCNC: 29.1 PG
MCHC RBC-ENTMCNC: 33 G/DL
MCV RBC AUTO: 88.1 FL
NONHDLC SERPL-MCNC: 114 MG/DL
PLATELET # BLD AUTO: 191 K/UL
POTASSIUM SERPL-SCNC: 4.5 MMOL/L
PROT SERPL-MCNC: 7.4 G/DL
RBC # BLD: 5.05 M/UL
RBC # FLD: 11.9 %
SODIUM SERPL-SCNC: 139 MMOL/L
TRIGL SERPL-MCNC: 194 MG/DL
TSH SERPL-ACNC: 1.65 UIU/ML
WBC # FLD AUTO: 4.79 K/UL

## 2025-04-23 ENCOUNTER — NON-APPOINTMENT (OUTPATIENT)
Age: 60
End: 2025-04-23

## 2025-04-23 ENCOUNTER — APPOINTMENT (OUTPATIENT)
Dept: CARDIOLOGY | Facility: CLINIC | Age: 60
End: 2025-04-23
Payer: MEDICAID

## 2025-04-23 VITALS
HEIGHT: 72 IN | HEART RATE: 60 BPM | DIASTOLIC BLOOD PRESSURE: 70 MMHG | WEIGHT: 200 LBS | BODY MASS INDEX: 27.09 KG/M2 | SYSTOLIC BLOOD PRESSURE: 112 MMHG | OXYGEN SATURATION: 93 %

## 2025-04-23 DIAGNOSIS — E78.5 HYPERLIPIDEMIA, UNSPECIFIED: ICD-10-CM

## 2025-04-23 DIAGNOSIS — I73.9 PERIPHERAL VASCULAR DISEASE, UNSPECIFIED: ICD-10-CM

## 2025-04-23 PROCEDURE — 99214 OFFICE O/P EST MOD 30 MIN: CPT

## 2025-04-23 PROCEDURE — 93000 ELECTROCARDIOGRAM COMPLETE: CPT

## 2025-04-23 PROCEDURE — G2211 COMPLEX E/M VISIT ADD ON: CPT | Mod: NC

## 2025-05-05 NOTE — ASU PATIENT PROFILE, ADULT - NS TRANSFER PATIENT BELONGINGS
After obtaining consent and by orders of , injection of Proquad given SQ and Kinrix given IM in RVL by Yun Jarrell MA. Patient tolerated well.   Cell Phone/PDA (specify)/Clothing

## 2025-08-18 ENCOUNTER — RX RENEWAL (OUTPATIENT)
Age: 60
End: 2025-08-18

## (undated) DEVICE — PLV/PSP-ESU FORCEFX F8J7721A: Type: DURABLE MEDICAL EQUIPMENT

## (undated) DEVICE — Device

## (undated) DEVICE — XI SEAL UNIV 5- 8 MM

## (undated) DEVICE — SOL IRR POUR NS 0.9% 1000ML

## (undated) DEVICE — ELCTR BOVIE PENCIL SMOKE EVACUATION

## (undated) DEVICE — DRAPE 3/4 SHEET W REINFORCEMENT 56X77"

## (undated) DEVICE — SOL IRR POUR H2O 1000ML

## (undated) DEVICE — XI ENDOWRIST SUCTION IRRIGATOR 8MM

## (undated) DEVICE — WARMING BLANKET UPPER ADULT

## (undated) DEVICE — PLV-SCD MACHINE: Type: DURABLE MEDICAL EQUIPMENT

## (undated) DEVICE — DRAPE TOWEL BLUE 17" X 24"

## (undated) DEVICE — SUT POLYSORB 0 30" GU-46

## (undated) DEVICE — TROCAR COVIDIEN ENDO CLOSE SUTURING DEVICE

## (undated) DEVICE — TUBING STRYKER PNEUMOCLEAR HIGH FLOW

## (undated) DEVICE — POSITIONER PINK PAD PIGAZZI SYSTEM

## (undated) DEVICE — VENODYNE/SCD SLEEVE CALF MEDIUM

## (undated) DEVICE — D HELP - CLEARVIEW CLEARIFY SYSTEM

## (undated) DEVICE — XI TIP COVER

## (undated) DEVICE — XI DRAPE COLUMN

## (undated) DEVICE — PACK GENERAL LAPAROSCOPY

## (undated) DEVICE — ELCTR BOVIE TIP BLADE INSULATED 2.75" EDGE

## (undated) DEVICE — SOL IRR BAG NS 0.9% 3000ML

## (undated) DEVICE — SUT STRATAFX PDS PLUS SYMMETRIC 2-0 6" CT-2

## (undated) DEVICE — SUT VLOC 180 3-0 6" V-20 GREEN

## (undated) DEVICE — GLV 7.5 PROTEXIS (WHITE)

## (undated) DEVICE — SMOKE EVACUTATION SYS LAPROSCOPIC AC/PA

## (undated) DEVICE — INSUFFLATION NDL COVIDIEN SURGINEEDLE VERESS 120MM

## (undated) DEVICE — PLV/PSP-SUCTION IRRIGATOR STRYKER: Type: DURABLE MEDICAL EQUIPMENT

## (undated) DEVICE — XI DRAPE ARM

## (undated) DEVICE — SUT POLYSORB 3-0 30" V-20 UNDYED

## (undated) DEVICE — VENODYNE/SCD SLEEVE CALF LARGE

## (undated) DEVICE — TROCAR COVIDIEN VERSAPORT BLADELESS OPTICAL 5MM STANDARD

## (undated) DEVICE — PLV-STRYKER LAPAROSCOPE 5MM 30 DEGREE: Type: DURABLE MEDICAL EQUIPMENT

## (undated) DEVICE — SUT MAXON 2-0 30" GS-21

## (undated) DEVICE — NDL HYPO SAFE 25G X 1.5" (ORANGE)

## (undated) DEVICE — SUT MONOCRYL 4-0 27" PS-2 UNDYED